# Patient Record
Sex: FEMALE | Race: WHITE | NOT HISPANIC OR LATINO | Employment: STUDENT | URBAN - METROPOLITAN AREA
[De-identification: names, ages, dates, MRNs, and addresses within clinical notes are randomized per-mention and may not be internally consistent; named-entity substitution may affect disease eponyms.]

---
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
## 2024-04-03 NOTE — PROGRESS NOTES
[facility] Consult  PATIENT:  Patricia Flanagan  MRN:  [id number]  :  2005  DATE OF SERVICE:  2024  REFERRED BY: Self, Referral  PCP: Shimon Sheikh MD    Assessment/Plan:     Migraine without aura and without status migrainosus, not intractable  4 year history of migraines being treated with Nurtec prescribed by the PCP. Patient stated that the medication is working well for her but she is still experiencing about 5 migraine headaches per month. She experiences occasional darting of her vision while she is at rest along with vertigo sensation.  This has happened about 10 times since  and only lasts 5 minutes or less.  She was evaluated by optometry but has never seen ophthalmology.   Workup:  With no new or concerning symptoms, no red flags and an unremarkable neurologic exam, there is no specific indication for further evaluation with MRI brain.  However, this could be obtained at any time if indicated  Blood work ordered for patient to complete at her convenience to check for treatable causes of her symptoms: vit D, TSH, iron, CBC, CMP  Preventative:  We discussed headache hygiene and lifestyle factors that may improve headaches  She wasn't keen on starting a preventative medication right away since her migraines are aborted by the nurtec.  She is still experiencing 5 or more migraine days per month, so I did explain the rationale behind preventative medication and that it might help with her ocular symptoms as well  We discussed supplements, Magnesium and B2 at 400mg daily, which patient seemed interested in  We discussed propranolol as well as CGRP medications like nurtec and Ajovy.  Information was provided to patient at today's visit and she would like to take time to consider her options  Past/ failed/contraindicated: amitriptyline, topamax  Future options: propranolol,  CGRP med, botox  Acute:  Discussed not taking over-the-counter or prescription pain medications  more than 3 days per week to prevent medication overuse/rebound headache  Continue nurtec at the earliest onset of a migraine   Future options:  Triptan, ubrelvy, reyvow      Other subjective visual disturbances  After roque, she mentioned to her mom that she was having some visual disturbances where her vision felt like it was shifting from left to right.  Denies blurry vision. Feel like the room is spinning when this happens but she has always been sitting down.  This has occurred about 10x since December and the episodes last less than 5 minutes.  She sometimes has these subjective visual disturbances without the head pain.  Mom has not been able to witness them so we are unsure if the eye darting is in fact nystagmus.  She is on the computer a lot and has a lot of screen time in general, so could be related to eye strain fatigue.  She saw the optometrist and they told her that her vision was good and they recommended eye drops for dry eye.  She is unsure if this has been helping with the symptoms.     Neuro exam in office was reassuring.  No nystagmus noted on exam  Referral to ophthalmology was ordered for patient for thorough dilated eye exam which she has never had completed  Symptoms are suspicious for ocular fatigue or possible ocular migraine  Continue eye drops for dry eyes  Avoid too much screen time which can lead to eye strain and fatigue       Diagnoses and all orders for this visit:    Migraine without aura and without status migrainosus, not intractable  -     Ambulatory Referral to Ophthalmology; Future  -     CBC and differential; Future  -     Comprehensive metabolic panel; Future  -     Iron Panel (Includes Ferritin, Iron Sat%, Iron, and TIBC); Future  -     Vitamin D 25 hydroxy; Future  -     TSH + Free T4; Future    Other subjective visual disturbances  -     Cancel: EEG Routine and awake; Future    Other orders  -     ibuprofen (MOTRIN) 600 mg tablet; Take 600 mg by mouth 4 (four) times a  day         Patient should follow up in 3 months, or I would be happy to see her sooner if needed.  Patient should call the office or send a MyChart message with any questions or concerns.  Patient should present to the nearest emergency department with any concerning symptoms.     CC:     We had the pleasure of evaluating Patricia in neurological consultation today. she is a 18 y.o. year-old female who presents today for evaluation of headaches.     History obtained from patient as well as available medical record review.    History of Present Illness:     This patient has a HX of Migraines and they are treated with Nurtec prescribed by the PCP. Patient stated that the medication is working well for her but she is still experiencing about 5 migraine headaches per month.     After roque, she mentioned to her mom that she was having some visual disturbances where her vision felt like it was shifting from left to right.  Denies blurry vision. Feel like the room is spinning when this happens but she has always been sitting down.  This has occurred about 10x since December and the episodes last less than 5 minutes.  She sometimes has these subjective visual disturbances without the head pain.  Mom has not been able to witness them so we are unsure if the eye darting is in fact nystagmus.  She is on the computer a lot and has a lot of screen time in general, so could be related to eye strain fatigue.  She saw the optometrist and they told her that her vision was good and they recommended eye drops for dry eye.  She is unsure if this has been helping with the symptoms.       Headaches started at what age? 14 years old.    How often do the headaches occur? 5 per mo nth  What time of the day do the headaches start?  Can have them upon waking or they progress throughout the day.   How long do the headaches last? Headaches could last all day. If she takes the nurtec it's usually gone within two hours.   Are you ever headache  free? No  Aura? without aura  Where is your headache located and pain quality? Stabbing pain on the top of her head can be left or right sided.   What is the intensity of pain? Worst 9/10, Average: 5/10  Associated symptoms:   [x] Nausea       [] Vomiting        [] Diarrhea  [] Stiff or sore neck   [] Problems with concentration  [x] Photophobia     [x]Phonophobia      [] Osmophobia  [] Blurred vision   [x] Prefer quiet, dark room  [x] Light-headed or dizzy     [] Tinnitus   [] Hands or feet tingle or feel numb/paresthesias    [] Ptosis      [] Facial droop  [] Lacrimation  [] Nasal congestion/rhinorrhea        Things that make the headache worse? Standing up makes the pain worse.     Headache triggers:  none in particular. Sometimes around menstrual cycle.     Have you seen someone else for headaches or pain? Yes, PCP treating  Have you had trigger point injection performed and how often? No  Have you had Botox injection performed and how often? No   Have you had epidural injections or transforaminal injections performed? No  Are you current pregnant or planning on getting pregnant? No. On birth control  Have you ever had any Brain imaging? yes brain MRI    Last eye exam: optometry     What medications do you take or have you taken for your headaches?:    ABORTIVE:    OTC medications: none  Prescription: nurtec  Past/failed/contraindicated:     PREVENTIVE:   OTC medications: none  Prescription: none  Past/failed/contraindicated: Topamax (patient wasn't happy with side effects), amitriptyline (ineffective)    Lifestyle history/modifications:    Sleep   Averages: 7-8 hours   Problems falling asleep?:   Yes  Problems staying asleep?:  No  Physical activity: nothing routine. Walking on campus  Water: 40oz per day  Caffeine: 0 per day. Occasional soda  Mood: feel like she is doing well. Currently in college.     Pertinent family history:  Family history of headaches:  migraine headaches in mother and grandmother  Any  family history of aneurysms - No    Pertinent social history:  Work: part time on campus  Education: full time college student currently  Lives with a roommate in the dorms  Illicit Drugs: denies  Alcohol/tobacco: Denies alcohol use, Denies tobacco use    Past Medical History:     Past Medical History:   Diagnosis Date    Croup in child     Hives     resolved    Migraines     Seasonal allergies     Wears contact lenses        Past Surgical History:   Procedure Laterality Date    RI CYSTO BLADDER W/URETERAL CATHETERIZATION N/A 10/10/2022    Procedure: CYSTOSCOPY WITH INSERTION STENT URETERAL;  Surgeon: Jorge Hernandez MD;  [facility];  Service: Urology    RI LAPAROSCOPY SURG PYELOPLASTY Left 10/10/2022    Procedure: PYELOPLASTY LAPAROSCOPIC W/ ROBOTICS;  Surgeon: Jorge Hernandez MD;  [facility];  Service: Urology    RI LAPAROSCOPY SURG PYELOPLASTY Right 6/19/2023    Procedure: PYELOPLASTY LAPAROSCOPIC W/ ROBOTICS;  Surgeon: Jorge Hernandez MD;  [facility];  Service: Urology    URETERAL STENT PLACEMENT Right 6/19/2023    Procedure: INSERTION STENT URETERAL;  Surgeon: Jorge Hernandez MD;  [facility];  Service: Urology       Patient Active Problem List   Diagnosis    Ankle pain    Allergic rhinitis due to house dust mite    Chronic seasonal allergic rhinitis due to pollen    Allergic rhinitis due to cats    Chronic idiopathic urticaria    Hydronephrosis    Flank pain    UPJ obstruction, congenital    Injury to kidney    Allergic reaction    Migraine without aura and without status migrainosus, not intractable    Other subjective visual disturbances       Medications:     Current Outpatient Medications   Medication Sig Dispense Refill    fluticasone (FLONASE) 50 mcg/act nasal spray 1 spray into each nostril if needed      ibuprofen (MOTRIN) 600 mg tablet Take 600 mg by mouth 4 (four) times a day      ketoconazole (NIZORAL) 2 % shampoo 2 (two) times a week      ketotifen (ZADITOR) 0.025  % ophthalmic solution 1 drop 2 (two) times a day as needed      Multiple Vitamin (multivitamin) tablet Take 1 tablet by mouth daily      norethindrone-ethinyl estradiol (Junel FE 1/20) 1-20 MG-MCG per tablet TAKE 1 TABLET BY MOUTH EVERY DAY 84 tablet 1    Nurtec 75 MG TBDP Take by mouth daily as needed       No current facility-administered medications for this visit.        Allergies:     Allergies   Allergen Reactions    Skin Protectants, Misc. [Lanolin] Hives     Has local allergic reaction to skin GLUE       Family History:     Family History   Problem Relation Age of Onset    Sjogren's syndrome Mother     Hashimoto's thyroiditis Mother     No Known Problems Father     No Known Problems Sister        Social History:     Social History     Socioeconomic History    Marital status: Single     Spouse name: Not on file    Number of children: 0    Years of education: Not on file    Highest education level: Not on file   Occupational History    Occupation: student    Tobacco Use    Smoking status: Never     Passive exposure: Never    Smokeless tobacco: Never   Vaping Use    Vaping status: Never Used   Substance and Sexual Activity    Alcohol use: Never    Drug use: Never    Sexual activity: Never   Other Topics Concern    Not on file   Social History Narrative    Who lives in your home: Mom, Dad, and sister     What type of home do you live in: Single house    Age of your home: older 100+     How long have you been living there: 2004    Type of heat: Baseboard and Other pellet stove    Type of fuel: Electric and Pellets    What type of leander is in your bedroom: Carpet    Do you have the following in or near your home:    Air products: Window air conditioning, Humidifier and Dehumidifier    Pests: None    Pets: Dog and Other hermit crab X 2     Are pets allowed in bedroom: Yes    Open fields, wooded areas nearby: N/A    Basement: Wet, Damp, Musty and Unfinished    Exposure to second hand smoke: No        Habits:     "Caffeine: soda 2 times a week    Chocolate: daily     Other:     Social Determinants of Health     Financial Resource Strain: Not on file   Food Insecurity: Not on file   Transportation Needs: Not on file   Physical Activity: Insufficiently Active (2021)    Exercise Vital Sign     Days of Exercise per Week: 2 days     Minutes of Exercise per Session: 50 min   Stress: Not on file   Social Connections: Not on file   Intimate Partner Violence: Not on file   Housing Stability: Not on file       Objective:     Physical Exam:    Vitals:  /73 (BP Location: Right arm, Patient Position: Sitting, Cuff Size: Standard)   Pulse 94   Temp 98.4 °F (36.9 °C) (Temporal)   Ht 5' 7\" (1.702 m)   Wt 53.1 kg (117 lb)   BMI 18.32 kg/m²   BP Readings from Last 3 Encounters:   24 111/73   23 116/78   23 100/70     Pulse Readings from Last 3 Encounters:   24 94   23 98   23 (!) 132       Constitutional:       Appearance: Normal appearance.   HENT:      Head: Normocephalic and atraumatic.      Nose: Nose normal.      Mouth: Mucous membranes are moist.   Eyes:      Extraocular Movements: Extraocular movements intact.      Conjunctiva/sclera: Conjunctivae normal.      Pupils: Pupils are equal, round, and reactive to light.    Pulmonary:      Effort: Pulmonary effort is normal.    Skin:     General: Skin is warm and dry.   Psychiatric:        Affect normal.   Neurologic Exam     Mental Status   Oriented to person, place, and time.   Speech: speech is normal   Level of consciousness: alert  Able to name object.     Cranial Nerves   Cranial nerves II through XII intact.     Motor Exam   Muscle bulk: normal    Strength   Strength 5/5 throughout.     Sensory Exam   Light touch normal.     Gait, Coordination, and Reflexes     Gait  Gait: normal    Reflexes   Right patellar: 2+  Left patellar: 2+      Pertinent lab results: no recent lab work to review.      Pertinent Imagin21 MRI Brain wo " contrast: Normal brain MRI.      Review of Systems:     Review of Systems   Constitutional:  Negative for appetite change and fever.   HENT: Negative.  Negative for hearing loss, tinnitus, trouble swallowing and voice change.    Eyes:  Positive for photophobia and visual disturbance (vision moves from left to right). Negative for pain.   Respiratory: Negative.  Negative for shortness of breath.    Cardiovascular: Negative.  Negative for palpitations.   Gastrointestinal:  Positive for nausea. Negative for vomiting.   Endocrine: Negative.  Negative for cold intolerance.   Genitourinary: Negative.  Negative for dysuria, frequency and urgency.   Musculoskeletal: Negative.  Negative for myalgias and neck pain.   Skin: Negative.  Negative for rash.   Neurological:  Positive for headaches. Negative for dizziness, tremors, seizures, syncope, facial asymmetry, speech difficulty, weakness, light-headedness and numbness.   Hematological: Negative.  Does not bruise/bleed easily.   Psychiatric/Behavioral:  Positive for sleep disturbance. Negative for confusion and hallucinations.    All other systems reviewed and are negative.       Reviewed ROS as entered by medical assistant.     I have spent a total time of 45 minutes on 04/04/24 in caring for this patient including Diagnostic results, Prognosis, Risks and benefits of tx options, Instructions for management, Patient and family education, Importance of tx compliance, Risk factor reductions, Impressions, Counseling / Coordination of care, Documenting in the medical record, Reviewing / ordering tests, medicine, procedures  , and Obtaining or reviewing history  .       Author:  ALEJA Maxwell 4/4/2024 10:12 AM

## 2024-04-03 NOTE — PATIENT INSTRUCTIONS
Patient Instructions:    Additional Testing  -You are not in need of cerebrovascular imaging at this time.   -I am recommending further workup with lab testing.  Complete at your convenience.   -Referral placed for ophthalmology-please schedule at your earliest convenience.     Headache/migraine treatment:     Prevention  -To take every day to help prevent headaches - not to take at the time of headache:  -Discussed propranolol and CGRP inhibitors-information provided.  Please reach out on [employer] after you have had time to review.   -Over the counter preventive supplements for headaches/migraines (if you try, try for 3 months straight):  -Magnesium 400mg daily (If any diarrhea or upset stomach, decrease dose as tolerated)  -Riboflavin (Vitamin B2) 400mg daily (may make your urine bright/neon yellow)  - All supplements can be purchased online    Abortive  -For immediate treatment of a headache/migraine  -For your more moderate to severe migraines take this medication as early as possible:  -Continue nurtec  -It is ok to take ibuprofen, acetaminophen or naproxen (Advil, Tylenol,  Aleve, Excedrin) if they help your headaches you should limit these to No more than 3 times a week to avoid medication overuse/rebound headaches.     Lifestyle Recommendations:    -Remain well-hydrated drinking at least 48 to 64 ounces of noncaffeinated beverages per day in addition to anything caffeinated.    -Getting adequate rest is also very important for migraine prevention (aim for 7-8 hours per night).     -Regular exercise is also beneficial for headache prevention.  I would encourage at the least 5 days of physical exercise weekly for at least 30 minutes.   -I would like for them to keep track of their migraines using an application on their phone or calendar as they see fit. Phone applications: Migraine Enrique or Migraine Diary.    Education and Follow-up:    -Please call or send a [employer] message with any questions or concerns.  Please present to the emergency room with any concerning symptoms such as: worst headache of your life, sudden painless loss of vision or double vision, difficulty speaking or swallowing, vertigo/room spinning that does not quickly resolve, or weakness/numbness/loss of coordination affecting 1 side of the face or body.  -Follow up in 3 months or sooner if needed.

## 2024-04-04 ENCOUNTER — OFFICE VISIT (OUTPATIENT)
Dept: NEUROLOGY | Facility: CLINIC | Age: 19
End: 2024-04-04

## 2024-04-04 VITALS
WEIGHT: 117 LBS | TEMPERATURE: 98.4 F | HEIGHT: 67 IN | DIASTOLIC BLOOD PRESSURE: 73 MMHG | SYSTOLIC BLOOD PRESSURE: 111 MMHG | HEART RATE: 94 BPM | BODY MASS INDEX: 18.36 KG/M2

## 2024-04-04 DIAGNOSIS — H53.19 OTHER SUBJECTIVE VISUAL DISTURBANCES: ICD-10-CM

## 2024-04-04 DIAGNOSIS — G43.009 MIGRAINE WITHOUT AURA AND WITHOUT STATUS MIGRAINOSUS, NOT INTRACTABLE: Primary | ICD-10-CM

## 2024-04-04 RX ORDER — IBUPROFEN 600 MG/1
600 TABLET ORAL 4 TIMES DAILY
COMMUNITY
Start: 2023-12-28

## 2024-04-04 NOTE — ASSESSMENT & PLAN NOTE
After roque, she mentioned to her mom that she was having some visual disturbances where her vision felt like it was shifting from left to right.  Denies blurry vision. Feel like the room is spinning when this happens but she has always been sitting down.  This has occurred about 10x since December and the episodes last less than 5 minutes.  She sometimes has these subjective visual disturbances without the head pain.  Mom has not been able to witness them so we are unsure if the eye darting is in fact nystagmus.  She is on the computer a lot and has a lot of screen time in general, so could be related to eye strain fatigue.  She saw the optometrist and they told her that her vision was good and they recommended eye drops for dry eye.  She is unsure if this has been helping with the symptoms.     Neuro exam in office was reassuring.  No nystagmus noted on exam  Referral to ophthalmology was ordered for patient for thorough dilated eye exam which she has never had completed  Symptoms are suspicious for ocular fatigue or possible ocular migraine  Continue eye drops for dry eyes  Avoid too much screen time which can lead to eye strain and fatigue

## 2024-04-04 NOTE — ASSESSMENT & PLAN NOTE
4 year history of migraines being treated with Nurtec prescribed by the PCP. Patient stated that the medication is working well for her but she is still experiencing about 5 migraine headaches per month. She experiences occasional darting of her vision while she is at rest along with vertigo sensation.  This has happened about 10 times since roque and only lasts 5 minutes or less.  She was evaluated by optometry but has never seen ophthalmology.   Workup:  With no new or concerning symptoms, no red flags and an unremarkable neurologic exam, there is no specific indication for further evaluation with MRI brain.  However, this could be obtained at any time if indicated  Blood work ordered for patient to complete at her convenience to check for treatable causes of her symptoms: vit D, TSH, iron, CBC, CMP  Preventative:  We discussed headache hygiene and lifestyle factors that may improve headaches  She wasn't keen on starting a preventative medication right away since her migraines are aborted by the nurtec.  She is still experiencing 5 or more migraine days per month, so I did explain the rationale behind preventative medication and that it might help with her ocular symptoms as well  We discussed supplements, Magnesium and B2 at 400mg daily, which patient seemed interested in  We discussed propranolol as well as CGRP medications like nurtec and Ajovy.  Information was provided to patient at today's visit and she would like to take time to consider her options  Past/ failed/contraindicated: amitriptyline, topamax  Future options: propranolol,  CGRP med, botox  Acute:  Discussed not taking over-the-counter or prescription pain medications more than 3 days per week to prevent medication overuse/rebound headache  Continue nurtec at the earliest onset of a migraine   Future options:  Triptan, ubrelvy, reyvow

## 2024-04-04 NOTE — PROGRESS NOTES
Review of Systems   Constitutional:  Negative for appetite change and fever.   HENT: Negative.  Negative for hearing loss, tinnitus, trouble swallowing and voice change.    Eyes:  Positive for photophobia and visual disturbance (vision moves from left to right). Negative for pain.   Respiratory: Negative.  Negative for shortness of breath.    Cardiovascular: Negative.  Negative for palpitations.   Gastrointestinal:  Positive for nausea. Negative for vomiting.   Endocrine: Negative.  Negative for cold intolerance.   Genitourinary: Negative.  Negative for dysuria, frequency and urgency.   Musculoskeletal: Negative.  Negative for myalgias and neck pain.   Skin: Negative.  Negative for rash.   Neurological:  Positive for headaches. Negative for dizziness, tremors, seizures, syncope, facial asymmetry, speech difficulty, weakness, light-headedness and numbness.   Hematological: Negative.  Does not bruise/bleed easily.   Psychiatric/Behavioral:  Positive for sleep disturbance. Negative for confusion and hallucinations.    All other systems reviewed and are negative.

## 2024-04-07 DIAGNOSIS — N92.1 MENOMETRORRHAGIA: ICD-10-CM

## 2024-04-10 DIAGNOSIS — N92.1 MENOMETRORRHAGIA: ICD-10-CM

## 2024-04-10 RX ORDER — NORETHINDRONE ACETATE AND ETHINYL ESTRADIOL 1MG-20(21)
1 KIT ORAL DAILY
Qty: 28 TABLET | Refills: 0 | Status: SHIPPED | OUTPATIENT
Start: 2024-04-10 | End: 2024-04-10 | Stop reason: SDUPTHER

## 2024-04-10 RX ORDER — NORETHINDRONE ACETATE AND ETHINYL ESTRADIOL AND FERROUS FUMARATE 1MG-20(21)
1 KIT ORAL DAILY
Qty: 84 TABLET | Refills: 1 | OUTPATIENT
Start: 2024-04-10

## 2024-04-10 RX ORDER — NORETHINDRONE ACETATE AND ETHINYL ESTRADIOL 1MG-20(21)
1 KIT ORAL DAILY
Qty: 84 TABLET | Refills: 0 | Status: SHIPPED | OUTPATIENT
Start: 2024-04-10

## 2024-04-10 NOTE — TELEPHONE ENCOUNTER
Reason for call:   [x] Refill   [] Prior Auth  [] Other:     Office:   [] PCP/Provider -   [x] Specialty/Provider - OB    Medication: norethindrone-ethinyl estradiol (Junel FE 1/20) 1-20 MG-MCG per tablet     Dose/Frequency:   Summary: TAKE 1 TABLET BY MOUTH EVERY DAY       Quantity: 84    Pharmacy: [id number] - [place], [place] - [address] [phone]     Does the patient have enough for 3 days?   [x] Yes   [] No - Send as HP to POD

## 2024-04-26 ENCOUNTER — NEW PATIENT (OUTPATIENT)
Dept: URBAN - METROPOLITAN AREA CLINIC 6 | Facility: CLINIC | Age: 19
End: 2024-04-26

## 2024-04-26 DIAGNOSIS — G43.109: ICD-10-CM

## 2024-04-26 DIAGNOSIS — H53.19: ICD-10-CM

## 2024-04-26 DIAGNOSIS — H50.51: ICD-10-CM

## 2024-04-26 PROCEDURE — 92004 COMPRE OPH EXAM NEW PT 1/>: CPT

## 2024-04-26 PROCEDURE — 92060 SENSORIMOTOR EXAMINATION: CPT | Mod: NC

## 2024-04-26 ASSESSMENT — VISUAL ACUITY
OD_CC: 20/20
OS_CC: 20/25-2

## 2024-04-26 ASSESSMENT — TONOMETRY
OD_IOP_MMHG: 24
OS_IOP_MMHG: 24

## 2024-05-11 NOTE — PROGRESS NOTES
Assessment/Plan   Problem List Items Addressed This Visit    None  Visit Diagnoses       Well woman exam    -  Primary    Menometrorrhagia        Relevant Medications    norethindrone-ethinyl estradiol (Junel FE 1/20) 1-20 MG-MCG per tablet            Discussion  I have discussed the importance of monthly self-breast exams, exercise and healthy diet.    Encourage safe sexual practices; STI testing - declines  Contraception - CRISPIN. Discussed side effects, discontinuing pill (if desires), long term effects, and effects on menstrual migrianes.    Reviewed Gardasil vaccine and prevention of HPV. Education provided with patient and mother. She will consider and set an appt if desiring.    The current ASCCP guidelines were reviewed. Patient does not require pap due to age.    All questions have been answered to her satisfaction  RTO for APE or sooner if needed    Subjective     HPI   Patricia Flanagan is a 19 y.o. female who presents for annual well woman exam.     Menarche - 15; LMP - 05/07/24; Periods are reg q 28 days and last 4 days; Bleed is light; cramping are the worst on the first day but is tolerable for the rest of the cycle.  She takes advil; helps. +menstrual migraines; has an appt with neurologist.    No vulvar itch/burn; No vaginal itch/burn; No abn discharge or odor;    No urinary sx - burning/pain/frequency/hematuria    (+) SBEs - no breast masses, asymmetry, nipple discharge or bleeding, changes in skin of breast, or breast tenderness bilaterally.    No abd/pelvic pain or HAs;     Pt is virginal.    Gardasil - never had.   (+) PCP for routine Bw/care;          Review of Systems   Constitutional:  Negative for fatigue.   Eyes:  Negative for photophobia and visual disturbance.   Respiratory:  Negative for cough and shortness of breath.    Cardiovascular:  Negative for chest pain and palpitations.   Gastrointestinal:  Negative for abdominal pain, blood in stool, constipation, diarrhea, nausea and rectal  pain.   Genitourinary:  Negative for dyspareunia, dysuria, flank pain, frequency, genital sores, menstrual problem, pelvic pain, urgency, vaginal bleeding, vaginal discharge and vaginal pain.   Musculoskeletal:  Negative for arthralgias and back pain.   Skin:  Negative for rash.   Neurological:  Positive for headaches. Negative for weakness.       The following portions of the patient's history were reviewed and updated as appropriate: allergies, current medications, past family history, past medical history, past social history, past surgical history, and problem list.         OB History          0    Para   0    Term   0       0    AB   0    Living   0         SAB   0    IAB   0    Ectopic   0    Multiple   0    Live Births   0                 Past Medical History:   Diagnosis Date    Croup in child     Hives     resolved    Migraines     Seasonal allergies     Wears contact lenses        Past Surgical History:   Procedure Laterality Date    NJ CYSTO BLADDER W/URETERAL CATHETERIZATION N/A 10/10/2022    Procedure: CYSTOSCOPY WITH INSERTION STENT URETERAL;  Surgeon: Jorge Hernandez MD;  [facility]: [facility];  Service: Urology    NJ LAPAROSCOPY SURG PYELOPLASTY Left 10/10/2022    Procedure: PYELOPLASTY LAPAROSCOPIC W/ ROBOTICS;  Surgeon: Jorge Hernandez MD;  [facility];  Service: Urology    NJ LAPAROSCOPY SURG PYELOPLASTY Right 2023    Procedure: PYELOPLASTY LAPAROSCOPIC W/ ROBOTICS;  Surgeon: Jorge Hernandez MD;  [facility];  Service: Urology    URETERAL STENT PLACEMENT Right 2023    Procedure: INSERTION STENT URETERAL;  Surgeon: Jorge Hernandez MD;  [facility];  Service: Urology       Family History   Problem Relation Age of Onset    Sjogren's syndrome Mother     Hashimoto's thyroiditis Mother     Migraines Mother     Thyroid disease Mother     No Known Problems Father     No Known Problems Sister     Heart disease Maternal Grandfather     Diabetes Paternal  Grandfather     Heart attack Paternal Grandfather     Heart disease Paternal Grandfather     Hypertension Paternal Grandfather     Cancer Paternal Grandmother        Social History     Socioeconomic History    Marital status: Single     Spouse name: Not on file    Number of children: 0    Years of education: Not on file    Highest education level: Not on file   Occupational History    Occupation: student    Tobacco Use    Smoking status: Never     Passive exposure: Never    Smokeless tobacco: Never   Vaping Use    Vaping status: Never Used   Substance and Sexual Activity    Alcohol use: Never    Drug use: Never    Sexual activity: Never   Other Topics Concern    Not on file   Social History Narrative    Who lives in your home: Mom, Dad, and sister     What type of home do you live in: Single house    Age of your home: older 100+     How long have you been living there: 2004    Type of heat: Baseboard and Other pellet stove    Type of fuel: Electric and Pellets    What type of leander is in your bedroom: Carpet    Do you have the following in or near your home:    Air products: Window air conditioning, Humidifier and Dehumidifier    Pests: None    Pets: Dog and Other hermit crab X 2     Are pets allowed in bedroom: Yes    Open fields, wooded areas nearby: N/A    Basement: Wet, Damp, Musty and Unfinished    Exposure to second hand smoke: No        Habits:    Caffeine: soda 2 times a week    Chocolate: daily     Other:     Social Determinants of Health     Financial Resource Strain: Not on file   Food Insecurity: Not on file   Transportation Needs: Not on file   Physical Activity: Insufficiently Active (12/16/2021)    Exercise Vital Sign     Days of Exercise per Week: 2 days     Minutes of Exercise per Session: 50 min   Stress: Not on file   Social Connections: Not on file   Intimate Partner Violence: Not on file   Housing Stability: Not on file         Current Outpatient Medications:     fluticasone (FLONASE) 50  "mcg/act nasal spray, 1 spray into each nostril if needed, Disp: , Rfl:     ketoconazole (NIZORAL) 2 % shampoo, 2 (two) times a week, Disp: , Rfl:     ketotifen (ZADITOR) 0.025 % ophthalmic solution, 1 drop 2 (two) times a day as needed, Disp: , Rfl:     Multiple Vitamin (multivitamin) tablet, Take 1 tablet by mouth daily, Disp: , Rfl:     norethindrone-ethinyl estradiol (Junel FE 1/20) 1-20 MG-MCG per tablet, Take 1 tablet by mouth daily, Disp: 84 tablet, Rfl: 3    Nurtec 75 MG TBDP, Take by mouth daily as needed, Disp: , Rfl:     Allergies   Allergen Reactions    Skin Protectants, Misc. [Lanolin] Hives     Has local allergic reaction to skin GLUE       Objective   Vitals:    05/13/24 1313   BP: 126/74   BP Location: Left arm   Patient Position: Sitting   Cuff Size: Standard   Weight: 50.8 kg (112 lb)   Height: 5' 7\" (1.702 m)     Physical Exam  Vitals and nursing note reviewed.   Constitutional:       Appearance: Normal appearance. She is well-developed and normal weight.   HENT:      Head: Normocephalic and atraumatic.   Eyes:      Conjunctiva/sclera: Conjunctivae normal.   Cardiovascular:      Rate and Rhythm: Normal rate and regular rhythm.      Heart sounds: Normal heart sounds.   Pulmonary:      Effort: Pulmonary effort is normal.      Breath sounds: Normal breath sounds.   Abdominal:      General: Abdomen is flat. There is no distension.      Palpations: Abdomen is soft. There is no mass.      Tenderness: There is no abdominal tenderness. There is no right CVA tenderness or left CVA tenderness.   Musculoskeletal:         General: No tenderness. Normal range of motion.      Cervical back: Normal range of motion. No tenderness.      Right lower leg: No edema.      Left lower leg: No edema.   Lymphadenopathy:      Cervical: No cervical adenopathy.   Skin:     General: Skin is warm and dry.   Neurological:      Mental Status: She is alert and oriented to person, place, and time.      Motor: No weakness. "   Psychiatric:         Mood and Affect: Mood normal.         Behavior: Behavior normal.         Thought Content: Thought content normal.         Judgment: Judgment normal.         There are no Patient Instructions on file for this visit.

## 2024-05-13 ENCOUNTER — APPOINTMENT (OUTPATIENT)
Dept: LAB | Facility: MEDICAL CENTER | Age: 19
End: 2024-05-13
Payer: COMMERCIAL

## 2024-05-13 ENCOUNTER — ANNUAL EXAM (OUTPATIENT)
Dept: OBGYN CLINIC | Facility: CLINIC | Age: 19
End: 2024-05-13
Payer: COMMERCIAL

## 2024-05-13 VITALS
BODY MASS INDEX: 17.58 KG/M2 | HEIGHT: 67 IN | SYSTOLIC BLOOD PRESSURE: 126 MMHG | WEIGHT: 112 LBS | DIASTOLIC BLOOD PRESSURE: 74 MMHG

## 2024-05-13 DIAGNOSIS — Z01.419 WELL WOMAN EXAM: Primary | ICD-10-CM

## 2024-05-13 DIAGNOSIS — G43.009 MIGRAINE WITHOUT AURA AND WITHOUT STATUS MIGRAINOSUS, NOT INTRACTABLE: ICD-10-CM

## 2024-05-13 DIAGNOSIS — N92.1 MENOMETRORRHAGIA: ICD-10-CM

## 2024-05-13 LAB
25(OH)D3 SERPL-MCNC: 22.5 NG/ML (ref 30–100)
ALBUMIN SERPL BCP-MCNC: 4.5 G/DL (ref 3.5–5)
ALP SERPL-CCNC: 73 U/L (ref 34–104)
ALT SERPL W P-5'-P-CCNC: 12 U/L (ref 7–52)
ANION GAP SERPL CALCULATED.3IONS-SCNC: 10 MMOL/L (ref 4–13)
AST SERPL W P-5'-P-CCNC: 15 U/L (ref 13–39)
BASOPHILS # BLD AUTO: 0.07 THOUSANDS/ÂΜL (ref 0–0.1)
BASOPHILS NFR BLD AUTO: 2 % (ref 0–1)
BILIRUB SERPL-MCNC: 0.31 MG/DL (ref 0.2–1)
BUN SERPL-MCNC: 14 MG/DL (ref 5–25)
CALCIUM SERPL-MCNC: 9 MG/DL (ref 8.4–10.2)
CHLORIDE SERPL-SCNC: 103 MMOL/L (ref 96–108)
CO2 SERPL-SCNC: 26 MMOL/L (ref 21–32)
CREAT SERPL-MCNC: 0.7 MG/DL (ref 0.6–1.3)
EOSINOPHIL # BLD AUTO: 0.1 THOUSAND/ÂΜL (ref 0–0.61)
EOSINOPHIL NFR BLD AUTO: 3 % (ref 0–6)
ERYTHROCYTE [DISTWIDTH] IN BLOOD BY AUTOMATED COUNT: 12.5 % (ref 11.6–15.1)
FERRITIN SERPL-MCNC: 23 NG/ML (ref 11–307)
GFR SERPL CREATININE-BSD FRML MDRD: 125 ML/MIN/1.73SQ M
GLUCOSE P FAST SERPL-MCNC: 81 MG/DL (ref 65–99)
HCT VFR BLD AUTO: 41.4 % (ref 34.8–46.1)
HGB BLD-MCNC: 13.4 G/DL (ref 11.5–15.4)
IMM GRANULOCYTES # BLD AUTO: 0.01 THOUSAND/UL (ref 0–0.2)
IMM GRANULOCYTES NFR BLD AUTO: 0 % (ref 0–2)
IRON SATN MFR SERPL: 13 % (ref 15–50)
IRON SERPL-MCNC: 54 UG/DL (ref 50–212)
LYMPHOCYTES # BLD AUTO: 1.76 THOUSANDS/ÂΜL (ref 0.6–4.47)
LYMPHOCYTES NFR BLD AUTO: 46 % (ref 14–44)
MCH RBC QN AUTO: 29.3 PG (ref 26.8–34.3)
MCHC RBC AUTO-ENTMCNC: 32.4 G/DL (ref 31.4–37.4)
MCV RBC AUTO: 90 FL (ref 82–98)
MONOCYTES # BLD AUTO: 0.33 THOUSAND/ÂΜL (ref 0.17–1.22)
MONOCYTES NFR BLD AUTO: 9 % (ref 4–12)
NEUTROPHILS # BLD AUTO: 1.52 THOUSANDS/ÂΜL (ref 1.85–7.62)
NEUTS SEG NFR BLD AUTO: 40 % (ref 43–75)
NRBC BLD AUTO-RTO: 0 /100 WBCS
PLATELET # BLD AUTO: 356 THOUSANDS/UL (ref 149–390)
PMV BLD AUTO: 9.8 FL (ref 8.9–12.7)
POTASSIUM SERPL-SCNC: 4 MMOL/L (ref 3.5–5.3)
PROT SERPL-MCNC: 7.5 G/DL (ref 6.4–8.4)
RBC # BLD AUTO: 4.58 MILLION/UL (ref 3.81–5.12)
SODIUM SERPL-SCNC: 139 MMOL/L (ref 135–147)
T4 FREE SERPL-MCNC: 0.84 NG/DL (ref 0.61–1.12)
TIBC SERPL-MCNC: 426 UG/DL (ref 250–450)
TSH SERPL DL<=0.05 MIU/L-ACNC: 1.23 UIU/ML (ref 0.45–4.5)
UIBC SERPL-MCNC: 372 UG/DL (ref 155–355)
WBC # BLD AUTO: 3.79 THOUSAND/UL (ref 4.31–10.16)

## 2024-05-13 PROCEDURE — 80053 COMPREHEN METABOLIC PANEL: CPT

## 2024-05-13 PROCEDURE — 36415 COLL VENOUS BLD VENIPUNCTURE: CPT

## 2024-05-13 PROCEDURE — 83550 IRON BINDING TEST: CPT

## 2024-05-13 PROCEDURE — S0612 ANNUAL GYNECOLOGICAL EXAMINA: HCPCS

## 2024-05-13 PROCEDURE — 82728 ASSAY OF FERRITIN: CPT

## 2024-05-13 PROCEDURE — 84439 ASSAY OF FREE THYROXINE: CPT

## 2024-05-13 PROCEDURE — 82306 VITAMIN D 25 HYDROXY: CPT

## 2024-05-13 PROCEDURE — 85025 COMPLETE CBC W/AUTO DIFF WBC: CPT

## 2024-05-13 PROCEDURE — 83540 ASSAY OF IRON: CPT

## 2024-05-13 PROCEDURE — 84443 ASSAY THYROID STIM HORMONE: CPT

## 2024-05-13 RX ORDER — NORETHINDRONE ACETATE AND ETHINYL ESTRADIOL 1MG-20(21)
1 KIT ORAL DAILY
Qty: 84 TABLET | Refills: 3 | Status: SHIPPED | OUTPATIENT
Start: 2024-05-13

## 2024-05-17 DIAGNOSIS — E55.9 VITAMIN D DEFICIENCY: ICD-10-CM

## 2024-05-17 DIAGNOSIS — E61.1 IRON DEFICIENCY: Primary | ICD-10-CM

## 2024-05-17 RX ORDER — ERGOCALCIFEROL 1.25 MG/1
50000 CAPSULE ORAL WEEKLY
Qty: 6 CAPSULE | Refills: 0 | Status: SHIPPED | OUTPATIENT
Start: 2024-05-17

## 2024-05-17 RX ORDER — FERROUS SULFATE 324(65)MG
324 TABLET, DELAYED RELEASE (ENTERIC COATED) ORAL
Qty: 30 TABLET | Refills: 3 | Status: SHIPPED | OUTPATIENT
Start: 2024-05-17

## 2024-06-14 DIAGNOSIS — H53.19 OTHER SUBJECTIVE VISUAL DISTURBANCES: Primary | ICD-10-CM

## 2024-06-14 DIAGNOSIS — G43.009 MIGRAINE WITHOUT AURA AND WITHOUT STATUS MIGRAINOSUS, NOT INTRACTABLE: ICD-10-CM

## 2024-06-14 NOTE — TELEPHONE ENCOUNTER
Per ophthalmology note dated 4/26/24, recommending repeat MRI since last one was from 5 years ago. Would like to evaluate for any structural abnormalities since patient reporting visual disturbances including intermittent nystagmus which was not see in the office during her ophthalmology visit.  I informed patient that I would place the MRI brain order and provided her with the phone number for central scheduling to set this up.

## 2024-06-24 ENCOUNTER — TELEPHONE (OUTPATIENT)
Dept: NEUROLOGY | Facility: CLINIC | Age: 19
End: 2024-06-24

## 2024-06-24 NOTE — TELEPHONE ENCOUNTER
"Grace  6/24/24 3:39PM:    \"This is Izabela Flyfemi, I'm calling for an authorization for Patricia Nickersonfemi and it's for her MRI of the brain with and without contrast. Her birthday is [birth year]. I just needed to speak to you about that. So if you could give me a call back. Thanks.\"    # [phone]  -----------------------------------------------  MRI Brain Referral  in chart 6/14/24   "

## 2024-07-08 ENCOUNTER — OFFICE VISIT (OUTPATIENT)
Dept: NEUROLOGY | Facility: CLINIC | Age: 19
End: 2024-07-08
Payer: COMMERCIAL

## 2024-07-08 VITALS
DIASTOLIC BLOOD PRESSURE: 78 MMHG | HEART RATE: 76 BPM | WEIGHT: 108.9 LBS | BODY MASS INDEX: 17.09 KG/M2 | TEMPERATURE: 97.8 F | SYSTOLIC BLOOD PRESSURE: 100 MMHG | HEIGHT: 67 IN

## 2024-07-08 DIAGNOSIS — G43.009 MIGRAINE WITHOUT AURA AND WITHOUT STATUS MIGRAINOSUS, NOT INTRACTABLE: ICD-10-CM

## 2024-07-08 DIAGNOSIS — E55.9 VITAMIN D DEFICIENCY: ICD-10-CM

## 2024-07-08 DIAGNOSIS — E61.1 IRON DEFICIENCY: Primary | ICD-10-CM

## 2024-07-08 PROCEDURE — 99214 OFFICE O/P EST MOD 30 MIN: CPT

## 2024-07-08 RX ORDER — RIZATRIPTAN BENZOATE 10 MG/1
TABLET ORAL
Qty: 12 TABLET | Refills: 2 | Status: SHIPPED | OUTPATIENT
Start: 2024-07-08

## 2024-07-08 RX ORDER — ATOGEPANT 60 MG/1
60 TABLET ORAL
Qty: 30 TABLET | Refills: 2 | Status: SHIPPED | OUTPATIENT
Start: 2024-07-08 | End: 2024-07-11 | Stop reason: SDUPTHER

## 2024-07-08 RX ORDER — MAGNESIUM 200 MG
200 TABLET ORAL DAILY
COMMUNITY

## 2024-07-08 RX ORDER — ADAPALENE 0.1 G/100G
1 CREAM TOPICAL EVERY OTHER DAY
COMMUNITY
Start: 2024-05-16

## 2024-07-08 NOTE — PATIENT INSTRUCTIONS
Patient Instructions:    Additional Testing  -Complete MRI as scheduled 7/10/24  -I am recommending further workup with lab testing.  Complete at your convenience (repeat iron and vitamin D)    Headache/migraine treatment:     Prevention  -To take every day to help prevent headaches - not to take at the time of headache:  -Start Qulitpa 60mg daily  -May continue over the counter preventive supplements for headaches/migraines (if you try, try for 3 months straight):  -Magnesium 400mg daily (If any diarrhea or upset stomach, decrease dose as tolerated)  -Riboflavin (Vitamin B2) 400mg daily (may make your urine bright/neon yellow)  - All supplements can be purchased online    Abortive  -For immediate treatment of a headache/migraine  -For your more moderate to severe migraines take this medication as early as possible:  -Stop Nurtec. Start Maxalt. One tablet at the onset of a migraine.  May repeat again in 2 hours if not completely headache free.   -It is ok to take ibuprofen, acetaminophen or naproxen (Advil, Tylenol,  Aleve, Excedrin) if they help your headaches you should limit these to No more than 3 times a week to avoid medication overuse/rebound headaches.     Lifestyle Recommendations:    -Remain well-hydrated drinking at least 48 to 64 ounces of noncaffeinated beverages per day in addition to anything caffeinated.    -Getting adequate rest is also very important for migraine prevention (aim for 7-8 hours per night).     -Regular exercise is also beneficial for headache prevention.  I would encourage at the least 5 days of physical exercise weekly for at least 30 minutes.   -I would like for them to keep track of their migraines using an application on their phone or calendar as they see fit. Phone applications: Migraine Enrique or Migraine Diary.    Education and Follow-up:    -Please call or send a [employer] message with any questions or concerns. Please present to the emergency room with any concerning symptoms  such as: worst headache of your life, sudden painless loss of vision or double vision, difficulty speaking or swallowing, vertigo/room spinning that does not quickly resolve, or weakness/numbness/loss of coordination affecting 1 side of the face or body.  -Follow up in 2 months or sooner if needed.

## 2024-07-08 NOTE — ASSESSMENT & PLAN NOTE
4 year history of migraines being treated with Nurtec prescribed by the PCP. Since her last office visit, she is doing about the same.  Reports still experiencing at least 5 migraines a month and at least one headache a week, which is the same frequency as her last office visit in April. She is still having the issues with her vision, although it has been approximately one month since the patient experienced the visual shifting sensation. . Nurtec has been taking longer to work for her migraines, which have been lasting approximately 7 hours. 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   Workup:  MRI brain ordered at last visit-patient has this scheduled for 7/10/24  Blood work reviewed: CMP, CBC, TSH all WNL.  Vitamin D and iron were low. Sent supplementation to her pharmacy and ordered new labs for her to have completed prior to her next office visit  Preventative:  We discussed headache hygiene and lifestyle factors that may improve headaches  She wasn't keen on starting a preventative medication right away since her migraines are aborted by the nurtec.  She is still experiencing 5 or more migraine days per month, so I did explain the rationale behind preventative medication and that it might help with her ocular symptoms as well  We discussed supplements, Magnesium and B2 at 400mg daily, which patient seemed interested in  We discussed propranolol as well as CGRP medications like nurtec and Ajovy.  Information was provided to patient at today's visit and she would like to take time to consider her options  Past/ failed/contraindicated: amitriptyline, topamax  Future options: propranolol,  CGRP med, botox  Acute:  Discussed not taking over-the-counter or prescription pain medications more than 3 days per week to prevent medication overuse/rebound headache  Continue nurtec at the earliest onset of a migraine   Future options:  Triptan, ubrelvy, reyvow

## 2024-07-08 NOTE — PROGRESS NOTES
[facility]  PATIENT:  Patricia Flanagan  MRN:  [id number]  :  2005  DATE OF SERVICE:  7/10/2024  REFERRED BY: No ref. provider found  PCP: Shimon Sheikh MD    Assessment/Plan:     Migraine without aura and without status migrainosus, not intractable  4 year history of migraines being treated with Nurtec prescribed by the PCP. Since her last office visit, she is doing about the same.  Reports still experiencing at least 5 migraines a month and at least one headache a week, which is the same frequency as her last office visit in April. She is still having the issues with her vision, although it has been approximately one month since the patient experienced the visual shifting sensation. . Nurtec has been taking longer to work for her migraines, which have been lasting approximately 7 hours. 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   Workup:  MRI brain ordered at last visit-patient has this scheduled for 7/10/24  Blood work reviewed: CMP, CBC, TSH all WNL.  Vitamin D and iron were low. Sent supplementation to her pharmacy and ordered new labs for her to have completed prior to her next office visit  Preventative:  We discussed headache hygiene and lifestyle factors that may improve headaches  She wasn't keen on starting a preventative medication right away since her migraines are aborted by the nurtec.  She is still experiencing 5 or more migraine days per month, so I did explain the rationale behind preventative medication and that it might help with her ocular symptoms as well  We discussed supplements, Magnesium and B2 at 400mg daily, which patient seemed interested in  We discussed propranolol as well as CGRP medications like nurtec and Ajovy.  Information was provided to patient at today's visit and she would like to take time to consider her options  Past/ failed/contraindicated: amitriptyline, topamax  Future options:  "propranolol,  CGRP med, botox  Acute:  Discussed not taking over-the-counter or prescription pain medications more than 3 days per week to prevent medication overuse/rebound headache  Continue nurtec at the earliest onset of a migraine   Future options:  Triptan, ubrelvy, reyvow         Diagnoses and all orders for this visit:    Iron deficiency  -     Iron Panel (Includes Ferritin, Iron Sat%, Iron, and TIBC); Future    Vitamin D deficiency  -     Vitamin D 25 hydroxy; Future    Migraine without aura and without status migrainosus, not intractable  -     Atogepant (Qulipta) 60 MG TABS; Take 60 mg by mouth daily at bedtime  -     rizatriptan (Maxalt) 10 mg tablet; 1 tab at onset of migraine; may repeat every 2 hours.  Take no more than 3 doses in a day.    Other orders  -     adapalene (DIFFERIN) 0.1 % cream; Apply 1 Application topically every other day  -     Magnesium 200 MG TABS; Take 200 mg by mouth in the morning  -     Riboflavin (B-2 PO); Take 1 tablet by mouth in the morning         Patient should follow up in 2 months, or I would be happy to see her sooner if needed.  Patient should call the office or send a [employer] message with any questions or concerns.  Patient should present to the nearest emergency department with any concerning symptoms.     CC:     We had the pleasure of evaluating Patricia in neurological follow up today. she is a 19 y.o. year-old female who presents today for evaluation of headaches.     History obtained from patient as well as available medical record review.    History of Present Illness:     She was last seen by me on 4/4/24.  For review, \"This patient has a HX of Migraines and they are treated with Nurtec prescribed by the PCP. Patient stated that the medication is working well for her but she is still experiencing about 5 migraine headaches per month.     After roque, she mentioned to her mom that she was having some visual disturbances where her vision felt like it was " "shifting from left to right.  Denies blurry vision. Feel like the room is spinning when this happens but she has always been sitting down.  This has occurred about 10x since December and the episodes last less than 5 minutes.  She sometimes has these subjective visual disturbances without the head pain.  Mom has not been able to witness them so we are unsure if the eye darting is in fact nystagmus.  She is on the computer a lot and has a lot of screen time in general, so could be related to eye strain fatigue.  She saw the optometrist and they told her that her vision was good and they recommended eye drops for dry eye.  She is unsure if this has been helping with the symptoms.\"        Interval history as of 7/8/24:  Since her last office visit, she is doing about the same.  Reports experiencing at least 5 migraines a month and at least one headache a week, which is the same frequency as her last office visit in April. She is still having the issues with her vision. Nurtec has been taking longer to work for her migraines, which have been lasting approximately 7 hours.  It has been approximately one month since the patient experienced the visual shifting sensation.  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     Headaches started at what age? 14 years old.    How often do the headaches occur? 5 per month  What time of the day do the headaches start?  Can have them upon waking or they progress throughout the day.   How long do the headaches last? Headaches could last all day.   Are you ever headache free? No  Aura? without aura  Where is your headache located and pain quality? Stabbing pain on the top of her head can be left or right sided.   What is the intensity of pain? Worst 9/10, Average: 5/10  Associated symptoms:   [x] Nausea       [] Vomiting        [] Diarrhea  [] Stiff or sore neck   [] Problems with concentration  [x] Photophobia     [x]Phonophobia     "  [] Osmophobia  [] Blurred vision   [x] Prefer quiet, dark room  [x] Light-headed or dizzy     [] Tinnitus   [] Hands or feet tingle or feel numb/paresthesias    [] Ptosis      [] Facial droop  [] Lacrimation  [] Nasal congestion/rhinorrhea        Things that make the headache worse? Standing up makes the pain worse.     Headache triggers:  none in particular. Sometimes around menstrual cycle.     Have you seen someone else for headaches or pain? Yes, PCP treating  Have you had trigger point injection performed and how often? No  Have you had Botox injection performed and how often? No   Have you had epidural injections or transforaminal injections performed? No  Are you current pregnant or planning on getting pregnant? No. On birth control  Have you ever had any Brain imaging? yes brain MRI    Last eye exam: optometry     What medications do you take or have you taken for your headaches?:    ABORTIVE:    OTC medications: none  Prescription: nurtec  Past/failed/contraindicated:     PREVENTIVE:   OTC medications: Magnesium and B2  Prescription: none  Past/failed/contraindicated: Topamax (patient wasn't happy with side effects), amitriptyline (ineffective)    Lifestyle history/modifications:    Sleep   Averages: 7-8 hours   Problems falling asleep?:   Yes  Problems staying asleep?:  No  Physical activity: nothing routine. Walking on campus  Water: 40oz per day  Caffeine: 0 per day. Occasional soda  Mood: feel like she is doing well. Currently in college.     Pertinent family history:  Family history of headaches:  migraine headaches in mother and grandmother  Any family history of aneurysms - No    Pertinent social history:  Work: part time on campus  Education: full time college student currently  Lives with a roommate in the dorms  Illicit Drugs: denies  Alcohol/tobacco: Denies alcohol use, Denies tobacco use    Past Medical History:     Past Medical History:   Diagnosis Date    Croup in child     Hives     resolved     Migraines     Seasonal allergies     Wears contact lenses        Past Surgical History:   Procedure Laterality Date    IN CYSTO BLADDER W/URETERAL CATHETERIZATION N/A 10/10/2022    Procedure: CYSTOSCOPY WITH INSERTION STENT URETERAL;  Surgeon: Jorge Hernandez MD;  [facility];  Service: Urology    IN LAPAROSCOPY SURG PYELOPLASTY Left 10/10/2022    Procedure: PYELOPLASTY LAPAROSCOPIC W/ ROBOTICS;  Surgeon: Jorge Hernandez MD;  [facility];  Service: Urology    IN LAPAROSCOPY SURG PYELOPLASTY Right 6/19/2023    Procedure: PYELOPLASTY LAPAROSCOPIC W/ ROBOTICS;  Surgeon: Jorge Hernandez MD;  [facility];  Service: Urology    URETERAL STENT PLACEMENT Right 6/19/2023    Procedure: INSERTION STENT URETERAL;  Surgeon: Jorge Hernandez MD;  [facility];  Service: Urology       Patient Active Problem List   Diagnosis    Ankle pain    Allergic rhinitis due to house dust mite    Chronic seasonal allergic rhinitis due to pollen    Allergic rhinitis due to cats    Chronic idiopathic urticaria    Hydronephrosis    Flank pain    UPJ obstruction, congenital    Injury to kidney    Allergic reaction    Migraine without aura and without status migrainosus, not intractable    Other subjective visual disturbances       Medications:     Current Outpatient Medications   Medication Sig Dispense Refill    adapalene (DIFFERIN) 0.1 % cream Apply 1 Application topically every other day      Atogepant (Qulipta) 60 MG TABS Take 60 mg by mouth daily at bedtime 30 tablet 2    ergocalciferol (VITAMIN D2) 50,000 units Take 1 capsule (50,000 Units total) by mouth once a week For 6 weeks 6 capsule 0    ferrous sulfate 324 (65 Fe) mg Take 1 tablet (324 mg total) by mouth daily before breakfast 30 tablet 3    fluticasone (FLONASE) 50 mcg/act nasal spray 1 spray into each nostril if needed      ketoconazole (NIZORAL) 2 % shampoo 2 (two) times a week      ketotifen (ZADITOR) 0.025 % ophthalmic solution Administer 1 drop to  both eyes 2 (two) times a day as needed      Magnesium 200 MG TABS Take 200 mg by mouth in the morning      Multiple Vitamin (multivitamin) tablet Take 1 tablet by mouth daily      norethindrone-ethinyl estradiol (Junel FE 1/20) 1-20 MG-MCG per tablet Take 1 tablet by mouth daily 84 tablet 3    Riboflavin (B-2 PO) Take 1 tablet by mouth in the morning      rizatriptan (Maxalt) 10 mg tablet 1 tab at onset of migraine; may repeat every 2 hours.  Take no more than 3 doses in a day. 12 tablet 2     No current facility-administered medications for this visit.        Allergies:     Allergies   Allergen Reactions    Skin Protectants, Misc. [Lanolin] Hives     Has local allergic reaction to skin GLUE       Family History:     Family History   Problem Relation Age of Onset    Sjogren's syndrome Mother     Hashimoto's thyroiditis Mother     Migraines Mother     Thyroid disease Mother     No Known Problems Father     No Known Problems Sister     Heart disease Maternal Grandfather     Diabetes Paternal Grandfather     Heart attack Paternal Grandfather     Heart disease Paternal Grandfather     Hypertension Paternal Grandfather     Cancer Paternal Grandmother        Social History:     Social History     Socioeconomic History    Marital status: Single     Spouse name: Not on file    Number of children: 0    Years of education: Not on file    Highest education level: Not on file   Occupational History    Occupation: student    Tobacco Use    Smoking status: Never     Passive exposure: Never    Smokeless tobacco: Never   Vaping Use    Vaping status: Never Used   Substance and Sexual Activity    Alcohol use: Never    Drug use: Never    Sexual activity: Never   Other Topics Concern    Not on file   Social History Narrative    Who lives in your home: Mom, Dad, and sister     What type of home do you live in: Single house    Age of your home: older 100+     How long have you been living there: 2004    Type of heat: Baseboard and Other  "pellet stove    Type of fuel: Electric and Pellets    What type of leander is in your bedroom: Carpet    Do you have the following in or near your home:    Air products: Window air conditioning, Humidifier and Dehumidifier    Pests: None    Pets: Dog and Other hermit crab X 2     Are pets allowed in bedroom: Yes    Open fields, wooded areas nearby: N/A    Basement: Wet, Damp, Musty and Unfinished    Exposure to second hand smoke: No        Habits:    Caffeine: soda 2 times a week    Chocolate: daily     Other:     Social Determinants of Health     Financial Resource Strain: Not on file   Food Insecurity: Not on file   Transportation Needs: Not on file   Physical Activity: Insufficiently Active (12/16/2021)    Exercise Vital Sign     Days of Exercise per Week: 2 days     Minutes of Exercise per Session: 50 min   Stress: Not on file   Social Connections: Not on file   Intimate Partner Violence: Not on file   Housing Stability: Not on file       Objective:     Physical Exam:    Vitals:  /78 (BP Location: Right arm, Patient Position: Sitting, Cuff Size: Standard)   Pulse 76   Temp 97.8 °F (36.6 °C) (Temporal)   Ht 5' 7\" (1.702 m)   Wt 49.4 kg (108 lb 14.4 oz)   BMI 17.06 kg/m²   BP Readings from Last 3 Encounters:   07/08/24 100/78   05/13/24 126/74   04/04/24 111/73     Pulse Readings from Last 3 Encounters:   07/08/24 76   04/04/24 94   11/24/23 98       Constitutional:       Appearance: Normal appearance.   HENT:      Head: Normocephalic and atraumatic.      Nose: Nose normal.      Mouth: Mucous membranes are moist.   Eyes:      Extraocular Movements: Extraocular movements intact.      Conjunctiva/sclera: Conjunctivae normal.      Pupils: Pupils are equal, round, and reactive to light.    Pulmonary:      Effort: Pulmonary effort is normal.    Skin:     General: Skin is warm and dry.   Psychiatric:        Affect normal.   Neurologic Exam     Mental Status   Oriented to person, place, and time.   Speech: " speech is normal   Level of consciousness: alert  Able to name object.     Cranial Nerves   Cranial nerves II through XII intact.     Motor Exam   Muscle bulk: normal    Strength   Strength 5/5 throughout.     Sensory Exam   Light touch normal.     Gait, Coordination, and Reflexes     Gait  Gait: normal    Reflexes   Right patellar: 2+  Left patellar: 2+      Pertinent lab results:   Lab Results   Component Value Date    SODIUM 139 2024    K 4.0 2024     2024    CO2 26 2024    AGAP 10 2024    BUN 14 2024    CREATININE 0.70 2024    GLUC 84 2023    GLUF 81 2024    CALCIUM 9.0 2024    AST 15 2024    ALT 12 2024    ALKPHOS 73 2024    TP 7.5 2024    TBILI 0.31 2024    EGFR 125 2024     Lab Results   Component Value Date    WBC 3.79 (L) 2024    HGB 13.4 2024    HCT 41.4 2024    MCV 90 2024     2024     Lab Results   Component Value Date    YDR7NWZNTIEQ 1.231 2024     Lab Results   Component Value Date    IRON 54 2024    TIBC 426 2024    FERRITIN 23 2024   Iron saturation 13(L)   UIBC 372 (H)    24 Vitamin D  22.5 (L)     Pertinent Imagin/10/24 MRI Brain scheduled   21 MRI Brain wo contrast: Normal brain MRI.      Review of Systems:     Constitutional: Negative.    HENT: Negative.     Eyes: Negative.    Respiratory: Negative.     Cardiovascular: Negative.    Gastrointestinal: Negative.    Endocrine: Negative.    Genitourinary: Negative.    Musculoskeletal: Negative.    Skin: Negative.    Allergic/Immunologic: Negative.    Neurological:  Positive for headaches.   Hematological: Negative.    Psychiatric/Behavioral: Negative.     Reviewed ROS as entered by medical assistant.     I have spent a total time of 30 minutes on 07/10/24 in caring for this patient including Diagnostic results, Prognosis, Risks and benefits of tx options, Instructions for  management, Patient and family education, Importance of tx compliance, Risk factor reductions, Impressions, Counseling / Coordination of care, Documenting in the medical record, Reviewing / ordering tests, medicine, procedures  , and Obtaining or reviewing history  .       Author:  ALEJA Maxwell 7/10/2024 12:40 PM

## 2024-07-10 ENCOUNTER — TELEPHONE (OUTPATIENT)
Age: 19
End: 2024-07-10

## 2024-07-10 ENCOUNTER — HOSPITAL ENCOUNTER (OUTPATIENT)
Dept: MRI IMAGING | Facility: HOSPITAL | Age: 19
Discharge: HOME | End: 2024-07-10
Payer: COMMERCIAL

## 2024-07-10 DIAGNOSIS — H53.19 OTHER SUBJECTIVE VISUAL DISTURBANCES: ICD-10-CM

## 2024-07-10 DIAGNOSIS — G43.009 MIGRAINE WITHOUT AURA AND WITHOUT STATUS MIGRAINOSUS, NOT INTRACTABLE: ICD-10-CM

## 2024-07-10 PROCEDURE — 70551 MRI BRAIN STEM W/O DYE: CPT

## 2024-07-11 ENCOUNTER — TELEPHONE (OUTPATIENT)
Dept: NEUROLOGY | Facility: CLINIC | Age: 19
End: 2024-07-11

## 2024-07-11 RX ORDER — ATOGEPANT 60 MG/1
60 TABLET ORAL
Qty: 90 TABLET | Refills: 3 | Status: SHIPPED | OUTPATIENT
Start: 2024-07-11

## 2024-07-11 NOTE — TELEPHONE ENCOUNTER
I attempted to submit PA via rx benefits, prompt PA and patient not found; also attempted to call pharmacy for insurance information and was unable to connect to pharmacy staff to clarify. I called patient who confirmed she still has [employer].  I called [employer] and was told [employer] [phone] has prescription benefits    I spoke to [facility] who advised no PA for qulipta is needed and is only covered for 90 days supply as this is a maintenance medication (approx cost will be $60); covered at [employer]    Karina, Please sign script for 90 day supply in order for insurance to cover, thanks for your help.

## 2024-07-11 NOTE — TELEPHONE ENCOUNTER
Received via [employer] from  Denials Department request for review of lab services 5/13/24.  Request scanned into [email or web address].

## 2024-07-11 NOTE — TELEPHONE ENCOUNTER
Received message from [employer] below:    Outcome  Additional Information Required  Your PA Request has been closed. The  is not the PA processor for the drug requested. For further inquiries please contact the number on the back of the member's prescription card. (Message [id number]). - JOANNA, Pharmacy Technician 07/10/2024 02:03 PM      Will attempt to submit to [email or web address] on their website.    Prior Auth (EOC) ID:  [id number]     PA submitted. Awaiting determination.

## 2024-07-12 NOTE — TELEPHONE ENCOUNTER
Called patient to advise, reached vm, left detailed message that qulipta covered by insurance at approx cost of $60 for 90 day supply, please call pharmacy to fill and cb if needed.

## 2024-07-12 NOTE — TELEPHONE ENCOUNTER
Karina, please see communication thread and attached request for additional diagnosis codes.  Can you assist with providing these? Thanks for your help.

## 2024-07-15 NOTE — TELEPHONE ENCOUNTER
ALEJA Maxwell  You7 hours ago (9:10 AM)       I updated these diagnosis codes for the vitamin D and Thyroid panel in Epic.  Thank you!!

## 2024-07-18 NOTE — RESULT ENCOUNTER NOTE
Bunny Gomez,    I was able to review your brain MRI and report and it came back without any abnormalities.     Please let me know if you have any additional questions or concerns.    Jean Claude,  Karina

## 2024-09-03 ENCOUNTER — LAB (OUTPATIENT)
Dept: LAB | Facility: CLINIC | Age: 19
End: 2024-09-03
Payer: COMMERCIAL

## 2024-09-03 DIAGNOSIS — E61.1 IRON DEFICIENCY: ICD-10-CM

## 2024-09-03 DIAGNOSIS — E55.9 VITAMIN D DEFICIENCY: ICD-10-CM

## 2024-09-03 LAB
25(OH)D3 SERPL-MCNC: 41 NG/ML (ref 30–100)
FERRITIN SERPL-MCNC: 35 NG/ML (ref 11–307)
IRON SATN MFR SERPL: 61 % (ref 15–50)
IRON SERPL-MCNC: 248 UG/DL (ref 50–212)
TIBC SERPL-MCNC: 405 UG/DL (ref 250–450)
UIBC SERPL-MCNC: 157 UG/DL (ref 155–355)

## 2024-09-03 PROCEDURE — 82306 VITAMIN D 25 HYDROXY: CPT

## 2024-09-03 PROCEDURE — 83540 ASSAY OF IRON: CPT

## 2024-09-03 PROCEDURE — 36415 COLL VENOUS BLD VENIPUNCTURE: CPT

## 2024-09-03 PROCEDURE — 82728 ASSAY OF FERRITIN: CPT

## 2024-09-03 PROCEDURE — 83550 IRON BINDING TEST: CPT

## 2024-09-04 ENCOUNTER — TELEPHONE (OUTPATIENT)
Dept: NEUROLOGY | Facility: CLINIC | Age: 19
End: 2024-09-04

## 2024-09-04 DIAGNOSIS — E61.1 IRON DEFICIENCY: Primary | ICD-10-CM

## 2024-09-04 NOTE — RESULT ENCOUNTER NOTE
Your iron panel came back and your iron and iron saturation came back quite elevated.  I recommended taking the iron supplement every other day and we can re check your labs in another 2-3 months.  I will place the script for the bloodwork.     Your vitamin D is in a great range. I recommend continuing the plan.     Please let me know if you have additional questions or concerns.

## 2024-09-11 NOTE — PATIENT INSTRUCTIONS
Patient Instructions:    Additional Testing  -No additional testing required at this time.     Headache/migraine treatment:     Prevention  -To take every day to help prevent headaches - not to take at the time of headache:  -Continue Qulitpa 60mg daily  -May continue over the counter preventive supplements for headaches/migraines (if you try, try for 3 months straight):  -Magnesium 400mg daily (If any diarrhea or upset stomach, decrease dose as tolerated)  -Riboflavin (Vitamin B2) 400mg daily (may make your urine bright/neon yellow)  - All supplements can be purchased online    Abortive  -For immediate treatment of a headache/migraine  -For your more moderate to severe migraines take this medication as early as possible:  -Continue Maxalt. One tablet at the onset of a migraine.  May repeat again in 2 hours if not completely headache free.   -It is ok to take ibuprofen, acetaminophen or naproxen (Advil, Tylenol,  Aleve, Excedrin) if they help your headaches you should limit these to No more than 3 times a week to avoid medication overuse/rebound headaches.     Lifestyle Recommendations:    -Remain well-hydrated drinking at least 48 to 64 ounces of noncaffeinated beverages per day in addition to anything caffeinated.    -Getting adequate rest is also very important for migraine prevention (aim for 7-8 hours per night).     -Regular exercise is also beneficial for headache prevention.  I would encourage at the least 5 days of physical exercise weekly for at least 30 minutes.   -I would like for them to keep track of their migraines using an application on their phone or calendar as they see fit. Phone applications: Migraine Enrique or Migraine Diary.    Education and Follow-up:    -Please call or send a [employer] message with any questions or concerns. Please present to the emergency room with any concerning symptoms such as: worst headache of your life, sudden painless loss of vision or double vision, difficulty speaking  or swallowing, vertigo/room spinning that does not quickly resolve, or weakness/numbness/loss of coordination affecting 1 side of the face or body.  -Follow up in 4 months or sooner if needed.

## 2024-09-11 NOTE — PROGRESS NOTES
[facility]  PATIENT:  Patricia Flanagan  MRN:  [id number]  :  2005  DATE OF SERVICE:  2024  REFERRED BY: No ref. provider found  PCP: Shimon Sheikh MD    Assessment/Plan:     Migraine without aura and without status migrainosus, not intractable  Since her last office visit, she reports a decrease in the frequency and intensity of her migraine headaches after starting the Qulipta. She has only experienced 4 migraines since starting Qulipta (~ 2/month). Less head pressure in between headaches as well.  Maxalt is effective at getting rid of the headaches after 1 dose.  Usually takes about 2 hours.  She reports not being as hungry and possibly losing a little bit of weight and was unsure if it was related to the Qulipta.  She denies any nausea, vomiting, or diarrhea with this medication.  She is back in school and lives on campus.  Her vitamin D levels were low which we had supplemented with over-the-counter vitamin D.  Repeat testing came back within normal range.  She was replacing her iron with over-the-counter supplementation but her most recent lab values showed elevated iron levels.  She was recommended to decrease her iron supplement from daily to every other day.  We will recheck these levels prior to her next follow-up.  Workup:  No additional workup required at this time  Preventative:  We discussed headache hygiene and lifestyle factors that may improve headaches  Continue Qulipta 60 mg daily.  If patient continues to notice weight loss we could stop this medication for a few months and see if that her weight stabilizes.  Past/ failed/contraindicated: amitriptyline, topamax  Future options: propranolol,  CGRP med, botox  Acute:  Discussed not taking over-the-counter or prescription pain medications more than 3 days per week to prevent medication overuse/rebound headache  Continue Maxalt at the earliest onset of a migraine.  May repeat again in 2 hours if not completely  "headache free  Future options:  Triptan, ubrelvy, reyvow         Diagnoses and all orders for this visit:    Iron deficiency  -     Iron Panel (Includes Ferritin, Iron Sat%, Iron, and TIBC); Future    Migraine without aura and without status migrainosus, not intractable  -     rizatriptan (Maxalt) 10 mg tablet; 1 tab at onset of migraine; may repeat every 2 hours.  Take no more than 3 doses in a day.  -     Atogepant (Qulipta) 60 MG TABS; Take 60 mg by mouth daily at bedtime    Vitamin D deficiency  -     Vitamin D 25 hydroxy; Future           Patient should follow up in 4 months, or I would be happy to see her sooner if needed.  Patient should call the office or send a [employer] message with any questions or concerns.  Patient should present to the nearest emergency department with any concerning symptoms.     CC:     We had the pleasure of evaluating Patricia in neurological follow up today. she is a 19 y.o. year-old female who presents today for evaluation of headaches.     History obtained from patient as well as available medical record review.    History of Present Illness:     She was last seen by me on 4/4/24.  For review, \"This patient has a HX of Migraines and they are treated with Nurtec prescribed by the PCP. Patient stated that the medication is working well for her but she is still experiencing about 5 migraine headaches per month.   After roque, she mentioned to her mom that she was having some visual disturbances where her vision felt like it was shifting from left to right.  Denies blurry vision. Feel like the room is spinning when this happens but she has always been sitting down.  This has occurred about 10x since December and the episodes last less than 5 minutes.  She sometimes has these subjective visual disturbances without the head pain.  Mom has not been able to witness them so we are unsure if the eye darting is in fact nystagmus.  She is on the computer a lot and has a lot of screen time in " "general, so could be related to eye strain fatigue.  She saw the optometrist and they told her that her vision was good and they recommended eye drops for dry eye.  She is unsure if this has been helping with the symptoms.\"      OV with me 7/8/24: Since her last office visit, she is doing about the same.  Reports experiencing at least 5 migraines a month and at least one headache a week, which is the same frequency as her last office visit in April. She is still having the issues with her vision. Nurtec has been taking longer to work for her migraines, which have been lasting approximately 7 hours.  It has been approximately one month since the patient experienced the visual shifting sensation.  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 She was started on Qulipta for prevention and Maxalt for rescue.     Interval history as of 9/12/24:  Since her last office visit, she reports a decrease in the frequency and intensity of her migraine headaches after starting the Qulipta. She has only experienced 4 migraines since starting Qulipta (~ 2/month). Less head pressure in between headaches as well.  Maxalt is effective at getting rid of the headaches after 1 dose.  Usually takes about 2 hours.  She reports not being as hungry and possibly losing a little bit of weight and was unsure if it was related to the Qulipta.  She denies any nausea, vomiting, or diarrhea with this medication.  She is back in school and lives on campus.    Headaches started at what age? 14 years old.    How often do the headaches occur? Last visit 5 per month. Now, 2/month  What time of the day do the headaches start?  Can have them upon waking or they progress throughout the day.   How long do the headaches last? Headaches could last all day.   Are you ever headache free? No  Aura? without aura  Where is your headache located and pain quality? Stabbing pain on the top of her head can be left or " right sided.   What is the intensity of pain? Worst 9/10, Average: 5/10  Associated symptoms:   [x] Nausea       [] Vomiting        [] Diarrhea  [] Stiff or sore neck   [] Problems with concentration  [x] Photophobia     [x]Phonophobia      [] Osmophobia  [] Blurred vision   [x] Prefer quiet, dark room  [x] Light-headed or dizzy     [] Tinnitus   [] Hands or feet tingle or feel numb/paresthesias    [] Ptosis      [] Facial droop  [] Lacrimation  [] Nasal congestion/rhinorrhea      Things that make the headache worse? Standing up makes the pain worse.     Headache triggers:  none in particular. Sometimes around menstrual cycle.     Have you seen someone else for headaches or pain? Yes, PCP treating  Have you had trigger point injection performed and how often? No  Have you had Botox injection performed and how often? No   Have you had epidural injections or transforaminal injections performed? No  Are you current pregnant or planning on getting pregnant? No. On birth control  Have you ever had any Brain imaging? yes brain MRI    Last eye exam: optometry     What medications do you take or have you taken for your headaches?:    ABORTIVE:    OTC medications: none  Prescription: Maxalt (effective)  Past/failed/contraindicated: nurtec (ineffective)    PREVENTIVE:   OTC medications: Magnesium and B2  Prescription: Qulipta (effective)  Past/failed/contraindicated: Topamax (patient wasn't happy with side effects), amitriptyline (ineffective)    Lifestyle history/modifications:    Sleep   Averages: 7-8 hours   Problems falling asleep?:   Yes  Problems staying asleep?:  No  Physical activity: nothing routine. Walking on campus  Water: 40oz per day  Caffeine: 0 per day. Occasional soda  Mood: feel like she is doing well. Currently in college.     Pertinent family history:  Family history of headaches:  migraine headaches in mother and grandmother  Any family history of aneurysms - No    Pertinent social history:  Work: part  time on campus  Education: full time college student currently  Lives with a roommate in the dorms  Illicit Drugs: denies  Alcohol/tobacco: Denies alcohol use, Denies tobacco use    Past Medical History:     Past Medical History:   Diagnosis Date    Croup in child     Hives     resolved    Migraines     Seasonal allergies     Wears contact lenses      Past Surgical History:   Procedure Laterality Date    SD CYSTO BLADDER W/URETERAL CATHETERIZATION N/A 10/10/2022    Procedure: CYSTOSCOPY WITH INSERTION STENT URETERAL;  Surgeon: Jorge Hernandez MD;  [facility];  Service: Urology    SD LAPAROSCOPY SURG PYELOPLASTY Left 10/10/2022    Procedure: PYELOPLASTY LAPAROSCOPIC W/ ROBOTICS;  Surgeon: Jorge Hernandez MD;  [facility];  Service: Urology    SD LAPAROSCOPY SURG PYELOPLASTY Right 6/19/2023    Procedure: PYELOPLASTY LAPAROSCOPIC W/ ROBOTICS;  Surgeon: Jorge Hernandez MD;  [facility];  Service: Urology    URETERAL STENT PLACEMENT Right 6/19/2023    Procedure: INSERTION STENT URETERAL;  Surgeon: Jorge Hernandez MD;  [facility];  Service: Urology     Patient Active Problem List   Diagnosis    Ankle pain    Allergic rhinitis due to house dust mite    Chronic seasonal allergic rhinitis due to pollen    Allergic rhinitis due to cats    Chronic idiopathic urticaria    Hydronephrosis    Flank pain    UPJ obstruction, congenital    Injury to kidney    Allergic reaction    Migraine without aura and without status migrainosus, not intractable    Other subjective visual disturbances     Medications:     Current Outpatient Medications   Medication Sig Dispense Refill    adapalene (DIFFERIN) 0.1 % cream Apply 1 Application topically every other day      Atogepant (Qulipta) 60 MG TABS Take 60 mg by mouth daily at bedtime 90 tablet 3    ferrous sulfate 324 (65 Fe) mg Take 1 tablet (324 mg total) by mouth daily before breakfast 30 tablet 3    fluticasone (FLONASE) 50 mcg/act nasal spray 1 spray into  each nostril if needed      ketoconazole (NIZORAL) 2 % shampoo 2 (two) times a week      ketotifen (ZADITOR) 0.025 % ophthalmic solution Administer 1 drop to both eyes 2 (two) times a day as needed      Magnesium 200 MG TABS Take 200 mg by mouth in the morning      Multiple Vitamin (multivitamin) tablet Take 1 tablet by mouth daily      norethindrone-ethinyl estradiol (Junel FE 1/20) 1-20 MG-MCG per tablet Take 1 tablet by mouth daily 84 tablet 3    Riboflavin (B-2 PO) Take 1 tablet by mouth in the morning      rizatriptan (Maxalt) 10 mg tablet 1 tab at onset of migraine; may repeat every 2 hours.  Take no more than 3 doses in a day. 12 tablet 2    ergocalciferol (VITAMIN D2) 50,000 units Take 1 capsule (50,000 Units total) by mouth once a week For 6 weeks 6 capsule 0     No current facility-administered medications for this visit.      Allergies:     Allergies   Allergen Reactions    Skin Protectants, Misc. [Lanolin] Hives     Has local allergic reaction to skin GLUE     Family History:     Family History   Problem Relation Age of Onset    Sjogren's syndrome Mother     Hashimoto's thyroiditis Mother     Migraines Mother     Thyroid disease Mother     No Known Problems Father     No Known Problems Sister     Heart disease Maternal Grandfather     Diabetes Paternal Grandfather     Heart attack Paternal Grandfather     Heart disease Paternal Grandfather     Hypertension Paternal Grandfather     Cancer Paternal Grandmother      Social History:     Social History     Socioeconomic History    Marital status: Single     Spouse name: Not on file    Number of children: 0    Years of education: Not on file    Highest education level: Not on file   Occupational History    Occupation: student    Tobacco Use    Smoking status: Never     Passive exposure: Never    Smokeless tobacco: Never   Vaping Use    Vaping status: Never Used   Substance and Sexual Activity    Alcohol use: Never    Drug use: Never    Sexual activity: Never  "  Other Topics Concern    Not on file   Social History Narrative    Who lives in your home: Mom, Dad, and sister     What type of home do you live in: Single house    Age of your home: older 100+     How long have you been living there: 2004    Type of heat: Baseboard and Other pellet stove    Type of fuel: Electric and Pellets    What type of leander is in your bedroom: Carpet    Do you have the following in or near your home:    Air products: Window air conditioning, Humidifier and Dehumidifier    Pests: None    Pets: Dog and Other hermit crab X 2     Are pets allowed in bedroom: Yes    Open fields, wooded areas nearby: N/A    Basement: Wet, Damp, Musty and Unfinished    Exposure to second hand smoke: No        Habits:    Caffeine: soda 2 times a week    Chocolate: daily     Other:     Social Determinants of Health     Financial Resource Strain: Not on file   Food Insecurity: Not on file   Transportation Needs: Not on file   Physical Activity: Insufficiently Active (12/16/2021)    Exercise Vital Sign     Days of Exercise per Week: 2 days     Minutes of Exercise per Session: 50 min   Stress: Not on file   Social Connections: Not on file   Intimate Partner Violence: Not on file   Housing Stability: Not on file     Objective:     Physical Exam:  Vitals:  /74 (BP Location: Right arm, Patient Position: Sitting, Cuff Size: Standard)   Pulse 81   Temp 98.1 °F (36.7 °C) (Temporal)   Ht 5' 7\" (1.702 m)   Wt 48.5 kg (107 lb)   BMI 16.76 kg/m²   BP Readings from Last 3 Encounters:   09/12/24 107/74   07/08/24 100/78   05/13/24 126/74     Pulse Readings from Last 3 Encounters:   09/12/24 81   07/08/24 76   04/04/24 94     On neurological examination the patient was awake, alert, attentive, oriented to person, place, and time. Recent and remote memory intact to conversation with no evidence of language dysfunction. Satisfactory fund of knowledge. Normal attention span and concentration.  Mood, affect and judgement " are appropriate. Speech is fluent without dysarthria or aphasia. Face appears symmetric, with no obvious weakness noted.  Audition is intact to casual conversation.  Eye movements are intact.  Able to move bilateral upper extremities antigravity without difficulty.      Pertinent lab results:   Lab Results   Component Value Date    SODIUM 139 2024    K 4.0 2024     2024    CO2 26 2024    AGAP 10 2024    BUN 14 2024    CREATININE 0.70 2024    GLUC 84 2023    GLUF 81 2024    CALCIUM 9.0 2024    AST 15 2024    ALT 12 2024    ALKPHOS 73 2024    TP 7.5 2024    TBILI 0.31 2024    EGFR 125 2024     Lab Results   Component Value Date    WBC 3.79 (L) 2024    HGB 13.4 2024    HCT 41.4 2024    MCV 90 2024     2024     Lab Results   Component Value Date    UBN3XDDGNHKP 1.231 2024     Lab Results   Component Value Date    IRON 248 (H) 2024    TIBC 405 2024    FERRITIN 35 2024     9/3/24 Vitamin D  41.0     Pertinent Imagin/10/24 MRI Brain: No abnormality evident.   21 MRI Brain wo contrast: Normal brain MRI.      Review of Systems:     Review of Systems   Constitutional:  Negative for appetite change and fever.   HENT: Negative.  Negative for hearing loss, tinnitus, trouble swallowing and voice change.    Eyes:  Positive for photophobia and visual disturbance (vision shifting). Negative for pain.   Respiratory: Negative.  Negative for shortness of breath.    Cardiovascular: Negative.  Negative for palpitations.   Gastrointestinal: Negative.  Negative for nausea and vomiting.   Endocrine: Negative.  Negative for cold intolerance.   Genitourinary: Negative.  Negative for dysuria, frequency and urgency.   Musculoskeletal: Negative.  Negative for myalgias and neck pain.   Skin: Negative.  Negative for rash.   Neurological:  Positive for headaches. Negative for  dizziness, tremors, seizures, syncope, facial asymmetry, speech difficulty, weakness, light-headedness and numbness.   Hematological: Negative.  Does not bruise/bleed easily.   Psychiatric/Behavioral:  Positive for sleep disturbance (trouble staying asleep). Negative for confusion and hallucinations.    All other systems reviewed and are negative.     Reviewed ROS as entered by medical assistant.     I have spent a total time of 35 minutes on 09/12/24 in caring for this patient including Diagnostic results, Prognosis, Risks and benefits of tx options, Instructions for management, Patient and family education, Importance of tx compliance, Risk factor reductions, Impressions, Counseling / Coordination of care, Documenting in the medical record, Reviewing / ordering tests, medicine, procedures  , and Obtaining or reviewing history  .       Author:  ALEJA Maxwell 9/12/2024 10:55 AM

## 2024-09-12 ENCOUNTER — OFFICE VISIT (OUTPATIENT)
Dept: NEUROLOGY | Facility: CLINIC | Age: 19
End: 2024-09-12
Payer: COMMERCIAL

## 2024-09-12 VITALS
DIASTOLIC BLOOD PRESSURE: 74 MMHG | BODY MASS INDEX: 16.79 KG/M2 | TEMPERATURE: 98.1 F | HEIGHT: 67 IN | SYSTOLIC BLOOD PRESSURE: 107 MMHG | WEIGHT: 107 LBS | HEART RATE: 81 BPM

## 2024-09-12 DIAGNOSIS — E61.1 IRON DEFICIENCY: Primary | ICD-10-CM

## 2024-09-12 DIAGNOSIS — E55.9 VITAMIN D DEFICIENCY: ICD-10-CM

## 2024-09-12 DIAGNOSIS — G43.009 MIGRAINE WITHOUT AURA AND WITHOUT STATUS MIGRAINOSUS, NOT INTRACTABLE: ICD-10-CM

## 2024-09-12 PROCEDURE — 99214 OFFICE O/P EST MOD 30 MIN: CPT

## 2024-09-12 RX ORDER — ATOGEPANT 60 MG/1
60 TABLET ORAL
Qty: 90 TABLET | Refills: 3 | Status: SHIPPED | OUTPATIENT
Start: 2024-09-12

## 2024-09-12 RX ORDER — RIZATRIPTAN BENZOATE 10 MG/1
TABLET ORAL
Qty: 12 TABLET | Refills: 2 | Status: SHIPPED | OUTPATIENT
Start: 2024-09-12

## 2024-09-12 NOTE — PROGRESS NOTES
Review of Systems   Constitutional:  Negative for appetite change and fever.   HENT: Negative.  Negative for hearing loss, tinnitus, trouble swallowing and voice change.    Eyes:  Positive for photophobia and visual disturbance (vision shifting). Negative for pain.   Respiratory: Negative.  Negative for shortness of breath.    Cardiovascular: Negative.  Negative for palpitations.   Gastrointestinal: Negative.  Negative for nausea and vomiting.   Endocrine: Negative.  Negative for cold intolerance.   Genitourinary: Negative.  Negative for dysuria, frequency and urgency.   Musculoskeletal: Negative.  Negative for myalgias and neck pain.   Skin: Negative.  Negative for rash.   Neurological:  Positive for headaches. Negative for dizziness, tremors, seizures, syncope, facial asymmetry, speech difficulty, weakness, light-headedness and numbness.   Hematological: Negative.  Does not bruise/bleed easily.   Psychiatric/Behavioral:  Positive for sleep disturbance (trouble staying asleep). Negative for confusion and hallucinations.    All other systems reviewed and are negative.

## 2024-09-12 NOTE — ASSESSMENT & PLAN NOTE
Since her last office visit, she reports a decrease in the frequency and intensity of her migraine headaches after starting the Qulipta. She has only experienced 4 migraines since starting Qulipta (~ 2/month). Less head pressure in between headaches as well.  Maxalt is effective at getting rid of the headaches after 1 dose.  Usually takes about 2 hours.  She reports not being as hungry and possibly losing a little bit of weight and was unsure if it was related to the Qulipta.  She denies any nausea, vomiting, or diarrhea with this medication.  She is back in school and lives [place] campus.  Her vitamin D levels were low which we had supplemented with over-the-counter vitamin D.  Repeat testing came back within normal range.  She was replacing her iron with over-the-counter supplementation but her most recent lab values showed elevated iron levels.  She was recommended to decrease her iron supplement from daily to every other day.  We will recheck these levels prior to her next follow-up.  Workup:  No additional workup required at this time  Preventative:  We discussed headache hygiene and lifestyle factors that may improve headaches  Continue Qulipta 60 mg daily.  If patient continues to notice weight loss we could stop this medication for a few months and see if that her weight stabilizes.  Past/ failed/contraindicated: amitriptyline, topamax  Future options: propranolol,  CGRP med, botox  Acute:  Discussed not taking over-the-counter or prescription pain medications more than 3 days per week to prevent medication overuse/rebound headache  Continue Maxalt at the earliest onset of a migraine.  May repeat again in 2 hours if not completely headache free  Future options:  Triptan, ubrelvy, reyvow

## 2024-09-24 ENCOUNTER — APPOINTMENT (OUTPATIENT)
Dept: LAB | Facility: MEDICAL CENTER | Age: 19
End: 2024-09-24
Payer: COMMERCIAL

## 2024-09-24 DIAGNOSIS — R55 SYNCOPE, UNSPECIFIED SYNCOPE TYPE: ICD-10-CM

## 2024-09-24 DIAGNOSIS — R53.83 OTHER FATIGUE: ICD-10-CM

## 2024-09-24 LAB
ALBUMIN SERPL BCG-MCNC: 4.4 G/DL (ref 3.5–5)
ALP SERPL-CCNC: 53 U/L (ref 34–104)
ALT SERPL W P-5'-P-CCNC: 8 U/L (ref 7–52)
ANION GAP SERPL CALCULATED.3IONS-SCNC: 7 MMOL/L (ref 4–13)
AST SERPL W P-5'-P-CCNC: 14 U/L (ref 13–39)
BACTERIA UR QL AUTO: ABNORMAL /HPF
BASOPHILS # BLD AUTO: 0.05 THOUSANDS/ΜL (ref 0–0.1)
BASOPHILS NFR BLD AUTO: 1 % (ref 0–1)
BILIRUB SERPL-MCNC: 0.46 MG/DL (ref 0.2–1)
BILIRUB UR QL STRIP: NEGATIVE
BUN SERPL-MCNC: 11 MG/DL (ref 5–25)
CALCIUM SERPL-MCNC: 9.3 MG/DL (ref 8.4–10.2)
CHLORIDE SERPL-SCNC: 102 MMOL/L (ref 96–108)
CLARITY UR: CLEAR
CO2 SERPL-SCNC: 27 MMOL/L (ref 21–32)
COLOR UR: YELLOW
CREAT SERPL-MCNC: 0.68 MG/DL (ref 0.6–1.3)
EOSINOPHIL # BLD AUTO: 0.07 THOUSAND/ΜL (ref 0–0.61)
EOSINOPHIL NFR BLD AUTO: 1 % (ref 0–6)
ERYTHROCYTE [DISTWIDTH] IN BLOOD BY AUTOMATED COUNT: 12.6 % (ref 11.6–15.1)
ERYTHROCYTE [SEDIMENTATION RATE] IN BLOOD: 16 MM/HOUR (ref 0–19)
GFR SERPL CREATININE-BSD FRML MDRD: 126 ML/MIN/1.73SQ M
GLUCOSE SERPL-MCNC: 79 MG/DL (ref 65–140)
GLUCOSE UR STRIP-MCNC: NEGATIVE MG/DL
HCT VFR BLD AUTO: 40.6 % (ref 34.8–46.1)
HGB BLD-MCNC: 13.3 G/DL (ref 11.5–15.4)
HGB UR QL STRIP.AUTO: ABNORMAL
IMM GRANULOCYTES # BLD AUTO: 0 THOUSAND/UL (ref 0–0.2)
IMM GRANULOCYTES NFR BLD AUTO: 0 % (ref 0–2)
KETONES UR STRIP-MCNC: NEGATIVE MG/DL
LEUKOCYTE ESTERASE UR QL STRIP: ABNORMAL
LYMPHOCYTES # BLD AUTO: 1.65 THOUSANDS/ΜL (ref 0.6–4.47)
LYMPHOCYTES NFR BLD AUTO: 34 % (ref 14–44)
MCH RBC QN AUTO: 30.1 PG (ref 26.8–34.3)
MCHC RBC AUTO-ENTMCNC: 32.8 G/DL (ref 31.4–37.4)
MCV RBC AUTO: 92 FL (ref 82–98)
MONOCYTES # BLD AUTO: 0.36 THOUSAND/ΜL (ref 0.17–1.22)
MONOCYTES NFR BLD AUTO: 7 % (ref 4–12)
MUCOUS THREADS UR QL AUTO: ABNORMAL
NEUTROPHILS # BLD AUTO: 2.78 THOUSANDS/ΜL (ref 1.85–7.62)
NEUTS SEG NFR BLD AUTO: 57 % (ref 43–75)
NITRITE UR QL STRIP: NEGATIVE
NON-SQ EPI CELLS URNS QL MICRO: ABNORMAL /HPF
NRBC BLD AUTO-RTO: 0 /100 WBCS
PH UR STRIP.AUTO: 6 [PH]
PLATELET # BLD AUTO: 311 THOUSANDS/UL (ref 149–390)
PMV BLD AUTO: 10.8 FL (ref 8.9–12.7)
POTASSIUM SERPL-SCNC: 4 MMOL/L (ref 3.5–5.3)
PROT SERPL-MCNC: 7.5 G/DL (ref 6.4–8.4)
PROT UR STRIP-MCNC: ABNORMAL MG/DL
RBC # BLD AUTO: 4.42 MILLION/UL (ref 3.81–5.12)
RBC #/AREA URNS AUTO: ABNORMAL /HPF
SODIUM SERPL-SCNC: 136 MMOL/L (ref 135–147)
SP GR UR STRIP.AUTO: 1.02 (ref 1–1.03)
TSH SERPL DL<=0.05 MIU/L-ACNC: 1.47 UIU/ML (ref 0.45–4.5)
UROBILINOGEN UR STRIP-ACNC: <2 MG/DL
WBC # BLD AUTO: 4.91 THOUSAND/UL (ref 4.31–10.16)
WBC #/AREA URNS AUTO: ABNORMAL /HPF

## 2024-09-24 PROCEDURE — 85025 COMPLETE CBC W/AUTO DIFF WBC: CPT

## 2024-09-24 PROCEDURE — 80053 COMPREHEN METABOLIC PANEL: CPT

## 2024-09-24 PROCEDURE — 36415 COLL VENOUS BLD VENIPUNCTURE: CPT

## 2024-09-24 PROCEDURE — 86664 EPSTEIN-BARR NUCLEAR ANTIGEN: CPT

## 2024-09-24 PROCEDURE — 84443 ASSAY THYROID STIM HORMONE: CPT

## 2024-09-24 PROCEDURE — 86645 CMV ANTIBODY IGM: CPT

## 2024-09-24 PROCEDURE — 86663 EPSTEIN-BARR ANTIBODY: CPT

## 2024-09-24 PROCEDURE — 85652 RBC SED RATE AUTOMATED: CPT

## 2024-09-24 PROCEDURE — 81001 URINALYSIS AUTO W/SCOPE: CPT

## 2024-09-24 PROCEDURE — 86665 EPSTEIN-BARR CAPSID VCA: CPT

## 2024-09-24 PROCEDURE — 86644 CMV ANTIBODY: CPT

## 2024-09-25 LAB
CMV IGG SERPL QL IA: NEGATIVE
CMV IGM SERPL QL IA: NEGATIVE
EBV EA IGG SER QL IA: NEGATIVE
EBV NA IGG SER QL IA: NEGATIVE
EBV VCA IGG SER QL IA: NEGATIVE
EBV VCA IGM SER QL IA: NEGATIVE

## 2024-11-27 ENCOUNTER — HOSPITAL ENCOUNTER (OUTPATIENT)
Dept: ULTRASOUND IMAGING | Facility: HOSPITAL | Age: 19
Discharge: HOME | End: 2024-11-27
Payer: COMMERCIAL

## 2024-11-27 DIAGNOSIS — Q62.39 UPJ OBSTRUCTION, CONGENITAL: ICD-10-CM

## 2024-11-27 PROCEDURE — 76775 US EXAM ABDO BACK WALL LIM: CPT

## 2024-12-18 ENCOUNTER — OFFICE VISIT (OUTPATIENT)
Dept: UROLOGY | Facility: CLINIC | Age: 19
End: 2024-12-18
Payer: COMMERCIAL

## 2024-12-18 VITALS
OXYGEN SATURATION: 98 % | DIASTOLIC BLOOD PRESSURE: 68 MMHG | HEART RATE: 93 BPM | SYSTOLIC BLOOD PRESSURE: 112 MMHG | WEIGHT: 103 LBS | BODY MASS INDEX: 16.17 KG/M2 | HEIGHT: 67 IN

## 2024-12-18 DIAGNOSIS — Q62.39 UPJ OBSTRUCTION, CONGENITAL: Primary | ICD-10-CM

## 2024-12-18 PROCEDURE — 99213 OFFICE O/P EST LOW 20 MIN: CPT | Performed by: PHYSICIAN ASSISTANT

## 2024-12-18 NOTE — PROGRESS NOTES
UROLOGY PROGRESS NOTE   Patient Identifiers: Patricia Flanagan (MRN [id number])  Date of Service: 12/18/2024    Subjective:   19-year-old female history of bilateral UPJ obstruction.  She had her first dismembered pyeloplasty in October 2022 and her second procedure was in June 2023.  She returns for follow-up a year after her last visit.  She reports no back pain no UTI.  Her kidney function remains normal.  She had a kidney and bladder ultrasound which showed expected redundancy of the renal pelvises after pyeloplasty with improvement of bilateral hydronephrosis.  A small stable cyst measuring 1.5 cm in the left lower pole with no other findings.  She is a college student and has no reported problems or complaints.    Reason for visit: Bilateral UPJ obstruction follow-up    Objective:     VITALS:    Vitals:    12/18/24 1121   BP: 112/68   Pulse: 93   SpO2: 98%           LABS:  Lab Results   Component Value Date    HGB 13.3 09/24/2024    HCT 40.6 09/24/2024    WBC 4.91 09/24/2024     09/24/2024   ]    Lab Results   Component Value Date    K 4.0 09/24/2024     09/24/2024    CO2 27 09/24/2024    BUN 11 09/24/2024    CREATININE 0.68 09/24/2024    CALCIUM 9.3 09/24/2024   ]        INPATIENT MEDS:    Current Outpatient Medications:     adapalene (DIFFERIN) 0.1 % cream, Apply 1 Application topically every other day, Disp: , Rfl:     Atogepant (Qulipta) 60 MG TABS, Take 60 mg by mouth daily at bedtime, Disp: 90 tablet, Rfl: 3    ergocalciferol (VITAMIN D2) 50,000 units, Take 1 capsule (50,000 Units total) by mouth once a week For 6 weeks, Disp: 6 capsule, Rfl: 0    fluticasone (FLONASE) 50 mcg/act nasal spray, 1 spray into each nostril if needed, Disp: , Rfl:     ketoconazole (NIZORAL) 2 % shampoo, 2 (two) times a week, Disp: , Rfl:     ketotifen (ZADITOR) 0.025 % ophthalmic solution, Administer 1 drop to both eyes 2 (two) times a day as needed, Disp: , Rfl:     Magnesium 200 MG TABS, Take 200 mg by mouth in the  "morning, Disp: , Rfl:     Multiple Vitamin (multivitamin) tablet, Take 1 tablet by mouth daily, Disp: , Rfl:     norethindrone-ethinyl estradiol (Junel FE 1/20) 1-20 MG-MCG per tablet, Take 1 tablet by mouth daily, Disp: 84 tablet, Rfl: 3    Riboflavin (B-2 PO), Take 1 tablet by mouth in the morning, Disp: , Rfl:     rizatriptan (Maxalt) 10 mg tablet, 1 tab at onset of migraine; may repeat every 2 hours.  Take no more than 3 doses in a day., Disp: 12 tablet, Rfl: 2    ferrous sulfate 324 (65 Fe) mg, Take 1 tablet (324 mg total) by mouth daily before breakfast (Patient not taking: Reported on 12/18/2024), Disp: 30 tablet, Rfl: 3      Physical Exam:   /68 (BP Location: Left arm, Patient Position: Sitting, Cuff Size: Standard)   Pulse 93   Ht 5' 7\" (1.702 m)   Wt 46.7 kg (103 lb)   SpO2 98%   BMI 16.13 kg/m²   GEN: no acute distress    RESP: breathing comfortably with no accessory muscle use    ABD: soft, non-tender, non-distended   INCISION:    EXT: no significant peripheral edema       RADIOLOGY:   IMPRESSION:     1.  Expected redundancy of the renal pelvises after pyeloplasty with significant improvement in bilateral hydronephrosis.     2.  Left lower pole cystic structure measures 1.5 cm and appears stable, potentially focally distended calyx from infundibular stenosis versus peripelvic cyst. No suspicious renal masses.    Assessment:   #1.  Bilateral UPJ obstruction with bilateral robotic dismembered pyeloplasty    Plan:   -I reviewed the findings with Patricia and her mother  -She has been stable with no complaints since her second surgery  -May follow-up with urology on as-needed basis and call with any future questions or concerns  -          "

## 2025-01-31 ENCOUNTER — TELEPHONE (OUTPATIENT)
Age: 20
End: 2025-01-31

## 2025-01-31 NOTE — TELEPHONE ENCOUNTER
Patricia Flanagan to [facility] (supporting ALEJA Maxwell)   HF      1/31/25 12:58 PM  Estela Collins!  I’m trying to refill my qulipta medication at [facility], but they are saying I need insurance approval. I didn’t know if there was anything you could do. Please let me know!     Thank you,   Patricia

## 2025-02-03 NOTE — TELEPHONE ENCOUNTER
"Prior auth submitted through [email or web address] website. While submitting the PA, the website \"timed out\". Called [employer] to follow up. Spoke with Jabari. Jabari confirmed that the PA was received. Faxed last OV note to #[phone]. EOC# [id number]. Pt's ID# [id number]. Awaiting determination.  "

## 2025-02-04 NOTE — TELEPHONE ENCOUNTER
Patient's mother called in to follow up on the PA being done for her daughter's medication because she is almost out and didn't realize she needed a PA. I advised her she could contact the insurance company and see if they can look in to the PA quicker and put it as a high priority and if worse comes to worse the doctor could send in something else for her, but it may cancel out the PA. She verbalized understanding and is going to try contacting the insurance company and call us back if she has any issues.

## 2025-02-04 NOTE — TELEPHONE ENCOUNTER
Patients mom called and stated she spoke with [facility]   Who stated no PA was submitted and provided the number that needs to be used to submitt -[phone] and or   [employer]       Izabela Flanagan (Mother)  [phone] (Mobile)  Please call mom to discuss further patient has no medication left.and mom is concerned about the timing of PA

## 2025-02-04 NOTE — TELEPHONE ENCOUNTER
Called [facility] and spoke with Annabel. Was advised that [facility] does not handle the prior authorizations for this pt, this needs to be done through [employer]. Prior auth was submitted online through [employer] and the determination is pending.    Called [employer] and spoke with Jose Manuel. Requested that the prior auth be marked as urgent as pt is currently out of medication. Jose Manuel was able to eze the prior auth as urgent and send it to the clinical team for an urgent review.

## 2025-02-05 NOTE — TELEPHONE ENCOUNTER
Qulipta is approved through 2/3/2026. Approval letter is scanned under the media tab. Called [facility] and left a message for the pharmacist making them aware of the approval. Called pt's mother, Izabela and made her aware.

## 2025-02-17 ENCOUNTER — TELEPHONE (OUTPATIENT)
Dept: NEUROLOGY | Facility: CLINIC | Age: 20
End: 2025-02-17

## 2025-02-21 ENCOUNTER — APPOINTMENT (OUTPATIENT)
Dept: LAB | Facility: CLINIC | Age: 20
End: 2025-02-21
Payer: COMMERCIAL

## 2025-02-21 DIAGNOSIS — E55.9 VITAMIN D DEFICIENCY: ICD-10-CM

## 2025-02-21 DIAGNOSIS — E61.1 IRON DEFICIENCY: ICD-10-CM

## 2025-02-21 LAB
25(OH)D3 SERPL-MCNC: 46.5 NG/ML (ref 30–100)
BASOPHILS # BLD AUTO: 0.05 THOUSANDS/ΜL (ref 0–0.1)
BASOPHILS NFR BLD AUTO: 1 % (ref 0–1)
EOSINOPHIL # BLD AUTO: 0.07 THOUSAND/ΜL (ref 0–0.61)
EOSINOPHIL NFR BLD AUTO: 1 % (ref 0–6)
ERYTHROCYTE [DISTWIDTH] IN BLOOD BY AUTOMATED COUNT: 12.4 % (ref 11.6–15.1)
FERRITIN SERPL-MCNC: 27 NG/ML (ref 11–307)
HCT VFR BLD AUTO: 39.4 % (ref 34.8–46.1)
HGB BLD-MCNC: 12.7 G/DL (ref 11.5–15.4)
IMM GRANULOCYTES # BLD AUTO: 0.01 THOUSAND/UL (ref 0–0.2)
IMM GRANULOCYTES NFR BLD AUTO: 0 % (ref 0–2)
IRON SATN MFR SERPL: 29 % (ref 15–50)
IRON SERPL-MCNC: 119 UG/DL (ref 50–212)
LYMPHOCYTES # BLD AUTO: 1.72 THOUSANDS/ΜL (ref 0.6–4.47)
LYMPHOCYTES NFR BLD AUTO: 29 % (ref 14–44)
MCH RBC QN AUTO: 29.4 PG (ref 26.8–34.3)
MCHC RBC AUTO-ENTMCNC: 32.2 G/DL (ref 31.4–37.4)
MCV RBC AUTO: 91 FL (ref 82–98)
MONOCYTES # BLD AUTO: 0.4 THOUSAND/ΜL (ref 0.17–1.22)
MONOCYTES NFR BLD AUTO: 7 % (ref 4–12)
NEUTROPHILS # BLD AUTO: 3.77 THOUSANDS/ΜL (ref 1.85–7.62)
NEUTS SEG NFR BLD AUTO: 62 % (ref 43–75)
NRBC BLD AUTO-RTO: 0 /100 WBCS
PLATELET # BLD AUTO: 295 THOUSANDS/UL (ref 149–390)
PMV BLD AUTO: 10.1 FL (ref 8.9–12.7)
RBC # BLD AUTO: 4.32 MILLION/UL (ref 3.81–5.12)
TIBC SERPL-MCNC: 417.2 UG/DL (ref 250–450)
TRANSFERRIN SERPL-MCNC: 298 MG/DL (ref 203–362)
UIBC SERPL-MCNC: 298 UG/DL (ref 155–355)
WBC # BLD AUTO: 6.02 THOUSAND/UL (ref 4.31–10.16)

## 2025-02-21 PROCEDURE — 83550 IRON BINDING TEST: CPT

## 2025-02-21 PROCEDURE — 36415 COLL VENOUS BLD VENIPUNCTURE: CPT

## 2025-02-21 PROCEDURE — 82306 VITAMIN D 25 HYDROXY: CPT

## 2025-02-21 PROCEDURE — 83540 ASSAY OF IRON: CPT

## 2025-02-21 PROCEDURE — 85025 COMPLETE CBC W/AUTO DIFF WBC: CPT

## 2025-02-21 PROCEDURE — 82728 ASSAY OF FERRITIN: CPT

## 2025-02-24 ENCOUNTER — RESULTS FOLLOW-UP (OUTPATIENT)
Dept: NEUROLOGY | Facility: CLINIC | Age: 20
End: 2025-02-24

## 2025-02-24 NOTE — PROGRESS NOTES
"[facility]  PATIENT:  Patricia Flanagan  MRN:  [id number]  :  2005  DATE OF SERVICE:  2025  REFERRED BY: No ref. provider found  PCP: Shimon Sheikh MD    Assessment/Plan:     Migraine without aura and without status migrainosus, not intractable  Since her last visit, she continues to take Qulipta for prevention which has been effective for her.  She missed a few doses secondary to not being able to  the prescription (insurance?) and noticed an increase in her migraine frequency until she started it back up.  She reported 0 migraines in January. Had 3 migraines in feb since missing some doses of the qulipta. She has not had any visual disturbances since around October.  She reports that she is living in a villa at school and is now able to cook for herself, so she believes her diet had been healthier than before which may be helping with her headache frequency as well.  Maxalt is effective for her but she experiences drowsiness and feels a little \"out of it\" when she takes this medication.  She experiences mild nausea with her migraines but doesn't feel that it is too troublesome at this juncture.    Workup:  7/10/24 MRI Brain: normal  24 Ophthalmology evaluation (under media)  Additional testing:   No additional workup required at this time  Preventative:  We discussed headache hygiene and lifestyle factors that may improve headaches  Continue Qulipta 60 mg daily.  If patient continues to notice weight loss we could stop this medication for a few months and see if that her weight stabilizes  Continue Magnesium and may add B2 at 400mg each daily  Past/ failed/contraindicated: amitriptyline, topamax  Future options: propranolol, CGRP med, botox  Acute:  Discussed not taking over-the-counter or prescription pain medications more than 3 days per week to prevent medication overuse/rebound headache  Continue Maxalt but decrease to 5mg at the earliest onset of a migraine.  May " "repeat again in 2 hours if not completely headache free. If this is ineffective or you still experience side effects, we discussed trying ubrelvy  Future options:  Triptan, ubrelvy, reyvow       Diagnoses and all orders for this visit:    Migraine without aura and without status migrainosus, not intractable  -     rizatriptan (MAXALT) 5 mg tablet; Take 1 tablet (5 mg total) by mouth as needed for migraine Take at the onset of migraine; if symptoms continue or return, may take another dose at least 2 hours after first dose. Take no more than 2 doses in a day.           CC:     We had the pleasure of evaluating Patricia in neurological follow up today. She is a 19 y.o. year-old female who presents today for evaluation of headaches.     History obtained from patient as well as available medical record review.    History of Present Illness:     She was last seen by me on 4/4/24.  For review, \"This patient has a HX of Migraines and they are treated with Nurtec prescribed by the PCP. Patient stated that the medication is working well for her but she is still experiencing about 5 migraine headaches per month.   After roque, she mentioned to her mom that she was having some visual disturbances where her vision felt like it was shifting from left to right.  Denies blurry vision. Feel like the room is spinning when this happens but she has always been sitting down.  This has occurred about 10x since December and the episodes last less than 5 minutes.  She sometimes has these subjective visual disturbances without the head pain.  Mom has not been able to witness them so we are unsure if the eye darting is in fact nystagmus.  She is on the computer a lot and has a lot of screen time in general, so could be related to eye strain fatigue.  She saw the optometrist and they told her that her vision was good and they recommended eye drops for dry eye.  She is unsure if this has been helping with the symptoms.\"      OV with me " 7/8/24: Since her last office visit, she is doing about the same.  Reports experiencing at least 5 migraines a month and at least one headache a week, which is the same frequency as her last office visit in April. She is still having the issues with her vision. Nurtec has been taking longer to work for her migraines, which have been lasting approximately 7 hours.  It has been approximately one month since the patient experienced the visual shifting sensation.  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 She was started on Qulipta for prevention and Maxalt for rescue.   OV with me 9/12/24:Since her last office visit, she reports a decrease in the frequency and intensity of her migraine headaches after starting the Qulipta. She has only experienced 4 migraines since starting Qulipta (~ 2/month). Less head pressure in between headaches as well.  Maxalt is effective at getting rid of the headaches after 1 dose.  Usually takes about 2 hours.  She reports not being as hungry and possibly losing a little bit of weight and was unsure if it was related to the Qulipta.  She denies any nausea, vomiting, or diarrhea with this medication.  She is back in school and lives on campus.    Interval history as of 2/25/25:  Since her last visit, she continues to take Qulipta for prevention which has been effective for her.  She missed a few doses secondary to not being able to  the prescription (insurance?) and noticed an increase in her migraine frequency until she started it back up.  She reported 0 migraines in January. Had 3 migraines in feb since missing some doses of the qulipta. She has not had any visual disturbances since around October.  She reports that she is living in a villa at school and is now able to cook for herself, so she believes her diet had been healthier than before which may be helping with her headache frequency as well.  Maxalt is effective for her  "but she experiences drowsiness and feels a little \"out of it\" when she takes this medication.  She experiences mild nausea with her migraines but doesn't feel that it is too troublesome at this juncture.      Headaches started at what age? 14 years old.    How often do the headaches occur? 5 per month. Last visit 2/month. Now, 2-3 per month  What time of the day do the headaches start?  Can have them upon waking or they progress throughout the day.   How long do the headaches last? Headaches could last all day.   Are you ever headache free? No  Aura? without aura  Where is your headache located and pain quality? Stabbing pain on the top of her head can be left or right sided.   What is the intensity of pain? Worst 9/10, Average: 5/10  Associated symptoms:   [x] Nausea       [] Vomiting        [] Diarrhea  [] Stiff or sore neck   [] Problems with concentration  [x] Photophobia     [x]Phonophobia      [] Osmophobia  [] Blurred vision   [x] Prefer quiet, dark room  [x] Light-headed or dizzy     [] Tinnitus   [] Hands or feet tingle or feel numb/paresthesias    [] Ptosis      [] Facial droop  [] Lacrimation  [] Nasal congestion/rhinorrhea      Things that make the headache worse? Standing up makes the pain worse.     Headache triggers:  none in particular. Sometimes around menstrual cycle.     Have you seen someone else for headaches or pain? Yes, PCP treating  Have you had trigger point injection performed and how often? No  Have you had Botox injection performed and how often? No   Have you had epidural injections or transforaminal injections performed? No  Are you current pregnant or planning on getting pregnant? No. On birth control  Have you ever had any Brain imaging? Yes brain MRI    Last eye exam: Optometry in December    What medications do you take or have you taken for your headaches?:    ABORTIVE:    OTC medications: none  Prescription: Maxalt (effective)  Past/failed/contraindicated: nurtec " (ineffective)    PREVENTIVE:   OTC medications: Magnesium and B2  Prescription: Qulipta (effective)  Past/failed/contraindicated: Topamax (side effects), amitriptyline (ineffective)    Lifestyle history/modifications:    Sleep   Averages: 7-8 hours   Problems falling asleep?:   Yes  Problems staying asleep?:  No  Physical activity: nothing routine. Walking on campus  Water: 40oz per day  Caffeine: 0 per day. Occasional soda  Mood: feel like she is doing well. Currently in college.     Pertinent family history:  Family history of headaches:  migraine headaches in mother and grandmother  Any family history of aneurysms - No    Pertinent social history:  Work: part time on campus  Education: full time college student currently  Lives with a roommate in the dorms  Illicit Drugs: denies  Alcohol/tobacco: Denies alcohol use, Denies tobacco use    Past Medical History:     Past Medical History:   Diagnosis Date    Croup in child     Hives     resolved    Migraines     Seasonal allergies     Wears contact lenses      Patient Active Problem List   Diagnosis    Ankle pain    Allergic rhinitis due to house dust mite    Chronic seasonal allergic rhinitis due to pollen    Allergic rhinitis due to cats    Chronic idiopathic urticaria    Hydronephrosis    Flank pain    UPJ obstruction, congenital    Injury to kidney    Allergic reaction    Migraine without aura and without status migrainosus, not intractable    Other subjective visual disturbances     Medications:     Current Outpatient Medications   Medication Sig Dispense Refill    adapalene (DIFFERIN) 0.1 % cream Apply 1 Application topically every other day      Atogepant (Qulipta) 60 MG TABS Take 60 mg by mouth daily at bedtime 90 tablet 3    ergocalciferol (VITAMIN D2) 50,000 units Take 1 capsule (50,000 Units total) by mouth once a week For 6 weeks 6 capsule 0    fluticasone (FLONASE) 50 mcg/act nasal spray 1 spray into each nostril if needed      ketoconazole (NIZORAL) 2 %  shampoo 2 (two) times a week      ketotifen (ZADITOR) 0.025 % ophthalmic solution Administer 1 drop to both eyes 2 (two) times a day as needed      Magnesium 200 MG TABS Take 200 mg by mouth in the morning      Multiple Vitamin (multivitamin) tablet Take 1 tablet by mouth daily      norethindrone-ethinyl estradiol (Junel FE 1/20) 1-20 MG-MCG per tablet Take 1 tablet by mouth daily 84 tablet 3    Riboflavin (B-2 PO) Take 1 tablet by mouth in the morning      rizatriptan (MAXALT) 5 mg tablet Take 1 tablet (5 mg total) by mouth as needed for migraine Take at the onset of migraine; if symptoms continue or return, may take another dose at least 2 hours after first dose. Take no more than 2 doses in a day. 18 tablet 2    ferrous sulfate 324 (65 Fe) mg Take 1 tablet (324 mg total) by mouth daily before breakfast (Patient not taking: Reported on 12/18/2024) 30 tablet 3     No current facility-administered medications for this visit.      Allergies:     Allergies   Allergen Reactions    Skin Protectants, Misc. [Lanolin] Hives     Has local allergic reaction to skin GLUE     Objective:     Physical Exam:  Vitals:  /70 (BP Location: Left arm, Patient Position: Sitting, Cuff Size: Adult)   Pulse 96   Temp 97.9 °F (36.6 °C) (Temporal)   Wt 46.1 kg (101 lb 9.6 oz)   SpO2 100%   BMI 15.91 kg/m²   BP Readings from Last 3 Encounters:   02/25/25 112/70   12/18/24 112/68   09/12/24 107/74     Pulse Readings from Last 3 Encounters:   02/25/25 96   12/18/24 93   09/12/24 81     On neurological examination the patient was awake, alert, attentive, oriented to person, place, and time. Recent and remote memory intact to conversation with no evidence of language dysfunction. Satisfactory fund of knowledge. Normal attention span and concentration.  Mood, affect and judgement are appropriate. Speech is fluent without dysarthria or aphasia. Face appears symmetric, with no obvious weakness noted.  Audition is intact to casual  conversation.  Eye movements are intact.  Able to move bilateral upper extremities antigravity without difficulty.      Pertinent lab results:   Lab Results   Component Value Date    SODIUM 136 2024    K 4.0 2024     2024    CO2 27 2024    AGAP 7 2024    BUN 11 2024    CREATININE 0.68 2024    GLUC 79 2024    GLUF 81 2024    CALCIUM 9.3 2024    AST 14 2024    ALT 8 2024    ALKPHOS 53 2024    TP 7.5 2024    TBILI 0.46 2024    EGFR 126 2024     Lab Results   Component Value Date    WBC 6.02 2025    HGB 12.7 2025    HCT 39.4 2025    MCV 91 2025     2025     Lab Results   Component Value Date    BWU2TXGUSOBS 1.470 2024     Lab Results   Component Value Date    IRON 119 2025    TIBC 417.2 2025    FERRITIN 27 2025 Vitamin D  46.5    Lab Results   Component Value Date    IRON 119 2025    TIBC 417.2 2025    FERRITIN 27 2025     Pertinent Imagin/10/24 MRI Brain: No abnormality evident.   21 MRI Brain wo contrast: Normal brain MRI.      Review of Systems:     Constitutional:  Negative for appetite change, fatigue and fever.   HENT: Negative.  Negative for hearing loss, tinnitus, trouble swallowing and voice change.    Eyes:  Positive for photophobia (w/migraines). Negative for pain and visual disturbance.   Respiratory: Negative.  Negative for shortness of breath.    Cardiovascular: Negative.  Negative for palpitations.   Gastrointestinal:  Positive for nausea (w/migraines). Negative for vomiting.   Endocrine: Negative.  Negative for cold intolerance.   Genitourinary: Negative.  Negative for dysuria, frequency and urgency.   Musculoskeletal:  Negative for back pain, gait problem, myalgias, neck pain and neck stiffness.   Skin: Negative.  Negative for rash.   Allergic/Immunologic: Negative.    Neurological:  Positive for  headaches (1-2 per month). Negative for dizziness, tremors, seizures, syncope, facial asymmetry, speech difficulty, weakness, light-headedness and numbness.   Hematological: Negative.  Does not bruise/bleed easily.   Psychiatric/Behavioral: Negative.  Negative for confusion, hallucinations and sleep disturbance.      Reviewed ROS as entered by medical assistant.     I have spent a total time of 30 minutes on 02/25/25 in caring for this patient including Diagnostic results, Prognosis, Risks and benefits of tx options, Instructions for management, Patient and family education, Importance of tx compliance, Risk factor reductions, Impressions, Counseling / Coordination of care, Documenting in the medical record, Reviewing / ordering tests, medicine, procedures  , and Obtaining or reviewing history  .       Author:  ALEJA Maxwell 2/25/2025 11:35 AM

## 2025-02-24 NOTE — PATIENT INSTRUCTIONS
Patient Instructions:    Additional Testing  -No additional testing required at this time.     Headache/migraine treatment:     Prevention  -To take every day to help prevent headaches - not to take at the time of headache:  -Continue Qulitpa 60mg daily  -May continue over the counter preventive supplements for headaches/migraines (if you try, try for 3 months straight):  -Magnesium 400mg daily (If any diarrhea or upset stomach, decrease dose as tolerated)  -Riboflavin (Vitamin B2) 400mg daily (may make your urine bright/neon yellow)  - All supplements can be purchased online    Abortive  -For immediate treatment of a headache/migraine  -For your more moderate to severe migraines take this medication as early as possible:  -Continue Maxalt but decrease to 5mg at the onset of a headache to see if you have fewer side effects.  May repeat again in 2 hours if not completely headache free.   -It is ok to take ibuprofen, acetaminophen or naproxen (Advil, Tylenol,  Aleve, Excedrin) if they help your headaches you should limit these to No more than 3 times a week to avoid medication overuse/rebound headaches.     Lifestyle Recommendations:    -Remain well-hydrated drinking at least 48 to 64 ounces of noncaffeinated beverages per day in addition to anything caffeinated.    -Getting adequate rest is also very important for migraine prevention (aim for 7-8 hours per night).     -Regular exercise is also beneficial for headache prevention.  I would encourage at the least 5 days of physical exercise weekly for at least 30 minutes.   -I would like for them to keep track of their migraines using an application on their phone or calendar as they see fit. Phone applications: Migraine Enrique or Migraine Diary.    Education and Follow-up:    -Please call or send a [employer] message with any questions or concerns. Please present to the emergency room with any concerning symptoms such as: worst headache of your life, sudden painless loss  of vision or double vision, difficulty speaking or swallowing, vertigo/room spinning that does not quickly resolve, or weakness/numbness/loss of coordination affecting 1 side of the face or body.  -Follow up in 4 months or sooner if needed.

## 2025-02-25 ENCOUNTER — OFFICE VISIT (OUTPATIENT)
Dept: NEUROLOGY | Facility: CLINIC | Age: 20
End: 2025-02-25
Payer: COMMERCIAL

## 2025-02-25 VITALS
TEMPERATURE: 97.9 F | OXYGEN SATURATION: 100 % | WEIGHT: 101.6 LBS | SYSTOLIC BLOOD PRESSURE: 112 MMHG | HEART RATE: 96 BPM | DIASTOLIC BLOOD PRESSURE: 70 MMHG | BODY MASS INDEX: 15.91 KG/M2

## 2025-02-25 DIAGNOSIS — G43.009 MIGRAINE WITHOUT AURA AND WITHOUT STATUS MIGRAINOSUS, NOT INTRACTABLE: Primary | ICD-10-CM

## 2025-02-25 PROCEDURE — 99214 OFFICE O/P EST MOD 30 MIN: CPT

## 2025-02-25 RX ORDER — RIZATRIPTAN BENZOATE 5 MG/1
5 TABLET ORAL AS NEEDED
Qty: 18 TABLET | Refills: 2 | Status: SHIPPED | OUTPATIENT
Start: 2025-02-25

## 2025-02-25 NOTE — PROGRESS NOTES
Name: Patricia Flanagan      : 2005      MRN: [id number]  Encounter Provider: ALEJA Maxwell  Encounter Date: 2025   Encounter department: NEUROLOGY [facility]  :  Assessment & Plan      {Ambulatory Patient Instructions (Optional):11570}    History of Present Illness {?Quick Links Encounters * My Last Note * Last Note in Specialty * Snapshot * Since Last Visit * History :48317}  HPI   Review of Systems   Constitutional:  Negative for appetite change, fatigue and fever.   HENT: Negative.  Negative for hearing loss, tinnitus, trouble swallowing and voice change.    Eyes:  Positive for photophobia (w/migraines). Negative for pain and visual disturbance.   Respiratory: Negative.  Negative for shortness of breath.    Cardiovascular: Negative.  Negative for palpitations.   Gastrointestinal:  Positive for nausea (w/migraines). Negative for vomiting.   Endocrine: Negative.  Negative for cold intolerance.   Genitourinary: Negative.  Negative for dysuria, frequency and urgency.   Musculoskeletal:  Negative for back pain, gait problem, myalgias, neck pain and neck stiffness.   Skin: Negative.  Negative for rash.   Allergic/Immunologic: Negative.    Neurological:  Positive for headaches (1-2 per month). Negative for dizziness, tremors, seizures, syncope, facial asymmetry, speech difficulty, weakness, light-headedness and numbness.   Hematological: Negative.  Does not bruise/bleed easily.   Psychiatric/Behavioral: Negative.  Negative for confusion, hallucinations and sleep disturbance.     I have personally reviewed the MA's review of systems and made changes as necessary.    {Select to insert medical history sections (Optional):84147}     Objective {?Quick Links Trend Vitals * Enter New Vitals * Results Review * Timeline (Adult) * Labs * Imaging * Cardiology * Procedures * Lung Cancer Screening * Surgical eConsent :24182}  There were no vitals taken for this visit.    Physical  Exam  Neurological Exam    {Radiology Results Review (Optional):78836}    {Administrative / Billing Section (Optional):63375}

## 2025-02-25 NOTE — ASSESSMENT & PLAN NOTE
"Since her last visit, she continues to take Qulipta for prevention which has been effective for her.  She missed a few doses secondary to not being able to  the prescription (insurance?) and noticed an increase in her migraine frequency until she started it back up.  She reported 0 migraines in January. Had 3 migraines in feb since missing some doses of the qulipta. She has not had any visual disturbances since around October.  She reports that she is living in a villa at school and is now able to cook for herself, so she believes her diet had been healthier than before which may be helping with her headache frequency as well.  Maxalt is effective for her but she experiences drowsiness and feels a little \"out of it\" when she takes this medication.  She experiences mild nausea with her migraines but doesn't feel that it is too troublesome at this juncture.    Workup:  7/10/24 MRI Brain: normal  4/26/24 Ophthalmology evaluation (under media)  Additional testing:   No additional workup required at this time  Preventative:  We discussed headache hygiene and lifestyle factors that may improve headaches  Continue Qulipta 60 mg daily.  If patient continues to notice weight loss we could stop this medication for a few months and see if that her weight stabilizes  Continue Magnesium and may add B2 at 400mg each daily  Past/ failed/contraindicated: amitriptyline, topamax  Future options: propranolol, CGRP med, botox  Acute:  Discussed not taking over-the-counter or prescription pain medications more than 3 days per week to prevent medication overuse/rebound headache  Continue Maxalt but decrease to 5mg at the earliest onset of a migraine.  May repeat again in 2 hours if not completely headache free. If this is ineffective or you still experience side effects, we discussed trying ubrelvy  Future options:  Triptan, ubrelvy, reyvow    "

## 2025-05-19 NOTE — PROGRESS NOTES
Subjective      Patricia Flanagan is a 20 y.o. female who presents for annual GYN exam.     GYN:  Menses are regular, q month, lasting 4 days. Denies intermenstrual bleeding, or spotting.   Denies vaginal discharge, labial erythema or lesions, dyspareunia.  Contraception: CRISPIN.  Patient is sexually active with  partner.      OB:  OB History    Para Term  AB Living   0 0 0 0 0 0   SAB IAB Ectopic Multiple Live Births   0 0 0 0 0         :  Denies dysuria, urinary frequency or urgency.  Denies hematuria, flank pain, incontinence.    Breast:  Denies breast mass, skin changes, dimpling, reddening, nipple retraction.  Denies breast discharge.  Patient does have a family history of breast, endometrial, colon, or ovarian ca.     Cancer-related family history includes Cancer in her paternal grandmother.     Past Medical History:   Diagnosis Date    Croup in child     Hives     resolved    Migraines     Seasonal allergies     Wears contact lenses        Past Surgical History:   Procedure Laterality Date    ID CYSTOURETHROSCOPY W/URETERAL CATHETERIZATION N/A 10/10/2022    Procedure: CYSTOSCOPY WITH INSERTION STENT URETERAL;  Surgeon: Jorge Hernandez MD;  [facility]: [facility];  Service: Urology    ID LAPAROSCOPY SURG PYELOPLASTY Left 10/10/2022    Procedure: PYELOPLASTY LAPAROSCOPIC W/ ROBOTICS;  Surgeon: Jorge Hernandez MD;  [facility];  Service: Urology    ID LAPAROSCOPY SURG PYELOPLASTY Right 2023    Procedure: PYELOPLASTY LAPAROSCOPIC W/ ROBOTICS;  Surgeon: Jorge Hernandez MD;  [facility];  Service: Urology    URETERAL STENT PLACEMENT Right 2023    Procedure: INSERTION STENT URETERAL;  Surgeon: Jorge Hernandez MD;  [facility];  Service: Urology         General:  BMI: 16  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   Breast cancer: last mammogram in Not on file.  "  Colon cancer: last colonoscopy in Not on file     Review of Systems   Constitutional:  Negative for fatigue.   Eyes:  Negative for photophobia and visual disturbance.   Respiratory:  Negative for cough and shortness of breath.    Cardiovascular:  Negative for chest pain and palpitations.   Gastrointestinal:  Negative for abdominal pain, blood in stool, constipation, diarrhea, nausea and rectal pain.   Genitourinary:  Negative for dyspareunia, dysuria, flank pain, frequency, genital sores, menstrual problem, pelvic pain, urgency, vaginal bleeding, vaginal discharge and vaginal pain.   Musculoskeletal:  Negative for arthralgias and back pain.   Skin:  Negative for rash.   Neurological:  Negative for weakness and headaches.   All other systems reviewed and are negative.         Objective      /80 (BP Location: Left arm, Patient Position: Sitting, Cuff Size: Standard)   Ht 5' 7\" (1.702 m)   Wt 47.6 kg (105 lb)   LMP 05/06/2025 (Exact Date)   BMI 16.45 kg/m²   Physical Exam  Vitals and nursing note reviewed.   Constitutional:       Appearance: Normal appearance.   HENT:      Head: Normocephalic.     Eyes:      Conjunctiva/sclera: Conjunctivae normal.       Cardiovascular:      Rate and Rhythm: Normal rate and regular rhythm.      Heart sounds: Normal heart sounds.   Pulmonary:      Effort: Pulmonary effort is normal.      Breath sounds: Normal breath sounds.   Chest:   Breasts:     Right: Normal. No inverted nipple, mass, nipple discharge, skin change or tenderness.      Left: Normal. No inverted nipple, mass, nipple discharge, skin change or tenderness.   Abdominal:      General: Abdomen is flat.      Palpations: Abdomen is soft. There is no mass.      Tenderness: There is no abdominal tenderness. There is no right CVA tenderness or left CVA tenderness.   Genitourinary:     General: Normal vulva.      Exam position: Lithotomy position.      Pubic Area: No rash or pubic lice.       Labia:         Right: No " rash or tenderness.         Left: No rash or tenderness.       Urethra: No prolapse or urethral pain.      Vagina: Normal. No vaginal discharge.      Cervix: Normal.      Uterus: Normal.       Adnexa: Right adnexa normal and left adnexa normal.        Right: No mass or tenderness.          Left: No mass or tenderness.       Musculoskeletal:         General: Normal range of motion.      Cervical back: Normal range of motion. No tenderness.      Right lower leg: No edema.      Left lower leg: No edema.   Lymphadenopathy:      Cervical: No cervical adenopathy.      Upper Body:      Right upper body: No supraclavicular or axillary adenopathy.      Left upper body: No supraclavicular or axillary adenopathy.      Lower Body: No right inguinal adenopathy. No left inguinal adenopathy.     Skin:     General: Skin is warm and dry.      Findings: No rash.     Neurological:      Mental Status: She is alert and oriented to person, place, and time.     Psychiatric:         Mood and Affect: Mood normal.         Behavior: Behavior normal.         Thought Content: Thought content normal.         Judgment: Judgment normal.                 Assessment/Plan  Problem List Items Addressed This Visit    None  Visit Diagnoses         Well woman exam    -  Primary      Menometrorrhagia        Relevant Medications    norethindrone-ethinyl estradiol (Junel FE 1/20) 1-20 MG-MCG per tablet      Cervical cancer screening        Relevant Orders    Liquid-based pap, screening            Denies GYN concerns today  She is interested in the Gardasil vaccine. Education provided. Reviewed to call insurance for approval and schedule nurse visit.  Birth control: CRISPIN  Cervical cancer screening: pap collected  Breast Cancer screening: age 40  Colon cancer Screening: age 45  STD screening: declines  Reviewed healthy lifestyle and safe sex practices  RTO for annual exam or PRN      ALEJA Vincent  OB/GYN  5/20/2025  11:12 AM          [1]   Social  History  Tobacco Use    Smoking status: Never     Passive exposure: Never    Smokeless tobacco: Never   Vaping Use    Vaping status: Never Used   Substance Use Topics    Alcohol use: Yes     Comment: socially    Drug use: Never

## 2025-05-20 ENCOUNTER — ANNUAL EXAM (OUTPATIENT)
Dept: OBGYN CLINIC | Facility: CLINIC | Age: 20
End: 2025-05-20

## 2025-05-20 VITALS
WEIGHT: 105 LBS | HEIGHT: 67 IN | DIASTOLIC BLOOD PRESSURE: 80 MMHG | BODY MASS INDEX: 16.48 KG/M2 | SYSTOLIC BLOOD PRESSURE: 110 MMHG

## 2025-05-20 DIAGNOSIS — N92.1 MENOMETRORRHAGIA: ICD-10-CM

## 2025-05-20 DIAGNOSIS — Z12.4 CERVICAL CANCER SCREENING: ICD-10-CM

## 2025-05-20 DIAGNOSIS — Z01.419 WELL WOMAN EXAM: Primary | ICD-10-CM

## 2025-05-20 PROCEDURE — G0145 SCR C/V CYTO,THINLAYER,RESCR: HCPCS

## 2025-05-20 RX ORDER — NORETHINDRONE ACETATE AND ETHINYL ESTRADIOL 1MG-20(21)
1 KIT ORAL DAILY
Qty: 84 TABLET | Refills: 3 | Status: SHIPPED | OUTPATIENT
Start: 2025-05-20

## 2025-05-23 ENCOUNTER — RESULTS FOLLOW-UP (OUTPATIENT)
Dept: OBGYN CLINIC | Facility: CLINIC | Age: 20
End: 2025-05-23

## 2025-05-23 LAB
LAB AP GYN PRIMARY INTERPRETATION: NORMAL
Lab: NORMAL

## 2025-06-02 ENCOUNTER — CLINICAL SUPPORT (OUTPATIENT)
Dept: OBGYN CLINIC | Facility: CLINIC | Age: 20
End: 2025-06-02
Payer: COMMERCIAL

## 2025-06-02 VITALS — BODY MASS INDEX: 16.57 KG/M2 | WEIGHT: 105.8 LBS | DIASTOLIC BLOOD PRESSURE: 68 MMHG | SYSTOLIC BLOOD PRESSURE: 104 MMHG

## 2025-06-02 DIAGNOSIS — Z23 NEED FOR HPV VACCINATION: Primary | ICD-10-CM

## 2025-06-02 PROCEDURE — 90471 IMMUNIZATION ADMIN: CPT

## 2025-06-02 PROCEDURE — 90651 9VHPV VACCINE 2/3 DOSE IM: CPT

## 2025-06-02 RX ORDER — SULFACETAMIDE SODIUM 100 MG/ML
LOTION TOPICAL
COMMUNITY
Start: 2025-05-20

## 2025-07-21 NOTE — PROGRESS NOTES
Name: Patricia Flanagan      : 2005      MRN: [id number]  Encounter Provider: ALEJA Maxwell  Encounter Date: 2025   Encounter department: [facility]  :  Assessment & Plan  Migraine with aura and without status migrainosus, not intractable  She has had a decrease in her headache frequency now about 1-2 per month (7 since Feb).  Intensity about the same and denies any new symptoms associated with the headaches.  She attributes the decrease in frequency to Qulipta and Magnesium but also reports that she has been keeping her sleep schedule regular which has been helpful.  With the abortive medication, headaches are no longer lasting an entire day and she generally only needs one dose of the Maxalt. She continues to experience occasional visual disturbances which do sound suspicious for auras.  She will see wavy lines and sometimes kaleidoscope vision which only lasts minutes at a time.  Sometimes she will develop a headache afterwards but not always.  This has happened only a couple of times since her last visit.  She has noted a decrease in auras with the Qulipta as well.  She had a normal eye exam in December and she denies morning headache they tend to progress throughout the day, and  denies tinnitus including pulsatile tinnitus.  She would like to keep her current headache regimen the same for now since it has been effective.  She can try taking the Maxalt at he onset of the aura rather than once the headache develops.  I do recommend a dilated eye exam at her next visit to rule out papilledema although I have a very low suspicion for this.  She is on an estrogen containing birth control and we did discuss the risk of migraine with aura and I would like her to have a discussion with her GYN with regard to switching to a progestin-only birth control option if possible.   Workup:  7/10/24 MRI Brain: normal  24 Ophthalmology evaluation (under  "media)  Additional testing:   No additional workup required at this time  Preventative:  We discussed headache hygiene and lifestyle factors that may improve headaches  Continue Qulipta 60 mg daily  Continue Magnesium and may add B2 at 400mg each daily  Past/ failed/contraindicated: amitriptyline, topamax  Future options: propranolol, CGRP med, botox  Acute:  Discussed not taking over-the-counter or prescription pain medications more than 3 days per week to prevent medication overuse/rebound headache  Continue Maxalt at the earliest onset of a migraine.  May repeat again in 2 hours if not completely headache free  Future options:  Triptan, ubrelvy, reyvow   Orders:    rizatriptan (MAXALT) 5 mg tablet; Take 1 tablet (5 mg total) by mouth as needed for migraine Take at the onset of migraine; if symptoms continue or return, may take another dose at least 2 hours after first dose. Take no more than 2 doses in a day.    Atogepant (Qulipta) 60 MG TABS; Take 60 mg by mouth daily at bedtime          History of Present Illness     We had the pleasure of evaluating Patricia in neurological follow up today. She is a 20 y.o. year-old female who presents today for evaluation of headaches.      History obtained from patient as well as available medical record review.     OV with me 4/4/24:  For review, \"This patient has a HX of Migraines and they are treated with Nurtec prescribed by the PCP. Patient stated that the medication is working well for her but she is still experiencing about 5 migraine headaches per month. After roque, she mentioned to her mom that she was having some visual disturbances where her vision felt like it was shifting from left to right.  Denies blurry vision. Feel like the room is spinning when this happens but she has always been sitting down.  This has occurred about 10x since December and the episodes last less than 5 minutes.  She sometimes has these subjective visual disturbances without the head " "pain.  Mom has not been able to witness them so we are unsure if the eye darting is in fact nystagmus.  She is on the computer a lot and has a lot of screen time in general, so could be related to eye strain fatigue.  She saw the optometrist and they told her that her vision was good and they recommended eye drops for dry eye.  She is unsure if this has been helping with the symptoms.\"      OV with me 7/8/24: Since her last office visit, she is doing about the same.  Reports experiencing at least 5 migraines a month and at least one headache a week, which is the same frequency as her last office visit in April. She is still having the issues with her vision. Nurtec has been taking longer to work for her migraines, which have been lasting approximately 7 hours.  It has been approximately one month since the patient experienced the visual shifting sensation.  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 She was started on Qulipta for prevention and Maxalt for rescue.   OV with me 9/12/24:Since her last office visit, she reports a decrease in the frequency and intensity of her migraine headaches after starting the Qulipta. She has only experienced 4 migraines since starting Qulipta (~ 2/month). Less head pressure in between headaches as well.  Maxalt is effective at getting rid of the headaches after 1 dose.  Usually takes about 2 hours.  She reports not being as hungry and possibly losing a little bit of weight and was unsure if it was related to the Qulipta.  She denies any nausea, vomiting, or diarrhea with this medication.  She is back in school and lives on campus.   OV with me 2/25/25: Since her last visit, she continues to take Qulipta for prevention which has been effective for her.  She missed a few doses secondary to not being able to  the prescription (insurance?) and noticed an increase in her migraine frequency until she started it back up.  She " "reported 0 migraines in January. Had 3 migraines in feb since missing some doses of the qulipta. She has not had any visual disturbances since around October.  She reports that she is living in a villa at school and is now able to cook for herself, so she believes her diet had been healthier than before which may be helping with her headache frequency as well.  Maxalt is effective for her but she experiences drowsiness and feels a little \"out of it\" when she takes this medication.  She experiences mild nausea with her migraines but doesn't feel that it is too troublesome at this juncture.      Interval history as of 7/22/25:  She has had a decrease in her headache frequency now about 1-2 per month (7 since Feb).  Intensity about the same and denies any new symptoms associated with the headaches.  She attributes the decrease in frequency to Qulipta and Magnesium but also reports that she has been keeping her sleep schedule regular which has been helpful.  With the abortive medication, headaches are no longer lasting an entire day and she generally only needs one dose of the Maxalt. She continues to experience occasional visual disturbances which do sound suspicious for auras.  She will see wavy lines and sometimes kaleidoscope vision which only lasts minutes at a time.  Sometimes she will develop a headache afterwards but not always.  This has happened only a couple of times since her last visit.  She has noted a decrease in auras with the Qulipta as well.  She had a normal eye exam in December and she denies morning headache they tend to progress throughout the day, and  denies tinnitus including pulsatile tinnitus.  She would like to keep her current headache regimen the same for now since it has been effective.  She can try taking the Maxalt at he onset of the aura rather than once the headache develops.  I do recommend a dilated eye exam at her next visit to rule out papilledema although I have a very low " suspicion for this.  She is on an estrogen containing birth control and we did discuss the risk of migraine with aura and I would like her to have a discussion with her GYN with regard to switching to a progestin-only birth control option if possible.      Headaches started at what age? 14 years old.    How often do the headaches occur? 5 per month. Last visit 2/month. 2/25/25: 2-3 per month. 7/22/25: 1-2 per month  What time of the day do the headaches start?  Can have them upon waking or they progress throughout the day.   How long do the headaches last? Headaches could last all day.     Are you ever headache free? No    Aura? With. She will see moving lines in her vision sometimes kaleidoscope.  This can last about 5 minutes.  Sometimes followed by the headache and sometimes not.      Where is your headache located and pain quality? Stabbing pain frontal or on the top of her head can be left or right sided.   What is the intensity of pain? Worst 9/10, Average: 5/10  Associated symptoms:   [x] Nausea       [] Vomiting        [] Diarrhea  [] Stiff or sore neck   [] Problems with concentration  [x] Photophobia     [x]Phonophobia      [] Osmophobia  [] Blurred vision   [x] Prefer quiet, dark room  [x] Light-headed or dizzy (occasionally with and without the headaches)  [] Tinnitus   [] Hands or feet tingle or feel numb/paresthesias    [] Ptosis      [] Facial droop  [] Lacrimation  [] Nasal congestion/rhinorrhea       Things that make the headache worse? Standing up makes the pain worse.      Headache triggers:  none in particular. Sometimes around menstrual cycle.      Have you seen someone else for headaches or pain? Yes, PCP treating  Have you had trigger point injection performed and how often? No  Have you had Botox injection performed and how often? No   Have you had epidural injections or transforaminal injections performed? No  Are you current pregnant or planning on getting pregnant? No. On birth  control  Have you ever had any Brain imaging? Yes brain MRI     Last eye exam: Optometry in December     What medications do you take or have you taken for your headaches?:     ABORTIVE:    OTC medications: none  Prescription: Maxalt (effective)  Past/failed/contraindicated: nurtec (ineffective)     PREVENTIVE:   OTC medications: Magnesium   Prescription: Qulipta (effective)  Past/failed/contraindicated: Topamax (side effects), amitriptyline (ineffective)     Lifestyle history/modifications:     Sleep   Averages: 7-8 hours   Problems falling asleep?: Yes  Problems staying asleep?:  No  Physical activity: Nothing routine. 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    Caffeine: 0 per day. Occasional soda  Mood: feel like she is doing well. Currently in college.      Pertinent family history:  Family history of headaches:  migraine headaches in mother and grandmother  Any family history of aneurysms - No     Pertinent social history:  Work: part time on campus  Education: full time college student currently  Lives with a roommate in the dorms  Illicit Drugs: denies  Alcohol/tobacco: Denies alcohol use, Denies tobacco use    Review of Systems   Constitutional:  Negative for appetite change, fatigue and fever.   HENT: Negative.  Negative for hearing loss, tinnitus, trouble swallowing and voice change.    Eyes: Negative.  Negative for photophobia, pain and visual disturbance.   Respiratory: Negative.  Negative for shortness of breath.    Cardiovascular: Negative.  Negative for palpitations.   Gastrointestinal: Negative.  Negative for nausea and vomiting.   Endocrine: Negative.  Negative for cold intolerance.   Genitourinary: Negative.  Negative for dysuria, frequency and urgency.   Musculoskeletal:  Negative for back pain, gait problem, myalgias, neck pain and neck stiffness.   Skin: Negative.  Negative for rash.   Allergic/Immunologic: Negative.     Neurological:  Negative for dizziness, tremors, seizures, syncope, facial asymmetry, speech difficulty, weakness, light-headedness, numbness and headaches.        Patient states having improvement with one to two migraines per month   Hematological: Negative.  Does not bruise/bleed easily.   Psychiatric/Behavioral: Negative.  Negative for confusion, hallucinations and sleep disturbance.      I have personally reviewed the MA's review of systems and made changes as necessary.    Medications Ordered Prior to Encounter[1]   Social History[2]     Objective   BP 90/70 (BP Location: Left arm, Patient Position: Sitting, Cuff Size: Large)   Pulse 88     On neurological examination the patient was awake, alert, attentive, oriented to person, place, and time. Recent and remote memory intact to conversation with no evidence of language dysfunction. Satisfactory fund of knowledge. Normal attention span and concentration.  Mood, affect and judgement are appropriate. Speech is fluent without dysarthria or aphasia. Face appears symmetric, with no obvious weakness noted. Audition is intact to casual conversation.  Eye movements are intact.  Able to move bilateral upper extremities antigravity without difficulty.      Labs:  Lab Results   Component Value Date    WBC 6.02 02/21/2025    HGB 12.7 02/21/2025    HCT 39.4 02/21/2025    MCV 91 02/21/2025     02/21/2025     Lab Results   Component Value Date    IRON 119 02/21/2025    TIBC 417.2 02/21/2025    FERRITIN 27 02/21/2025     Radiology Results Review:  7/10/24 MRI Brain: Unremarkable.   8/12/21 MRI Brain wo contrast: Normal Brain MRI.     Administrative Statements   I have spent a total time of 30 minutes in caring for this patient on the day of the visit/encounter including Diagnostic results, Prognosis, Risks and benefits of tx options, Instructions for management, Patient and family education, Importance of tx compliance, Risk factor reductions, Impressions, Counseling  / Coordination of care, Documenting in the medical record, Reviewing/placing orders in the medical record (including tests, medications, and/or procedures), and Obtaining or reviewing history  .         [1]   Current Outpatient Medications on File Prior to Visit   Medication Sig Dispense Refill    adapalene (DIFFERIN) 0.1 % cream Apply 1 Application topically every other day      ergocalciferol (VITAMIN D2) 50,000 units Take 1 capsule (50,000 Units total) by mouth once a week For 6 weeks 6 capsule 0    fluticasone (FLONASE) 50 mcg/act nasal spray 1 spray into each nostril if needed      ketoconazole (NIZORAL) 2 % shampoo 2 (two) times a week      ketotifen (ZADITOR) 0.025 % ophthalmic solution Administer 1 drop to both eyes as needed in the morning and 1 drop as needed in the evening.      Klaron 10 % LOTN Apply topically      Magnesium 200 MG TABS Take 200 mg by mouth in the morning      Multiple Vitamin (multivitamin) tablet Take 1 tablet by mouth in the morning.      norethindrone-ethinyl estradiol (Junel FE 1/20) 1-20 MG-MCG per tablet Take 1 tablet by mouth daily 84 tablet 3    [DISCONTINUED] Atogepant (Qulipta) 60 MG TABS Take 60 mg by mouth daily at bedtime 90 tablet 3    [DISCONTINUED] Riboflavin (B-2 PO) Take 1 tablet by mouth in the morning      [DISCONTINUED] rizatriptan (MAXALT) 5 mg tablet Take 1 tablet (5 mg total) by mouth as needed for migraine Take at the onset of migraine; if symptoms continue or return, may take another dose at least 2 hours after first dose. Take no more than 2 doses in a day. 18 tablet 2    [DISCONTINUED] ferrous sulfate 324 (65 Fe) mg Take 1 tablet (324 mg total) by mouth daily before breakfast (Patient not taking: Reported on 12/18/2024) 30 tablet 3     No current facility-administered medications on file prior to visit.   [2]   Social History  Tobacco Use    Smoking status: Never     Passive exposure: Never    Smokeless tobacco: Never   Vaping Use    Vaping status: Never Used    Substance and Sexual Activity    Alcohol use: Yes     Comment: socially    Drug use: Never    Sexual activity: Yes     Partners: Male     Birth control/protection: OCP, Condom Male

## 2025-07-21 NOTE — PATIENT INSTRUCTIONS
Migraine patients with aura, like other individuals, should avoid cigarette smoking and manage stroke risk factors such as hypertension, hyperlipidemia, and diabetes mellitus.     Women with migraine over age 35 years, and those with aura at any age should consider birth control alternative to combined oral contraceptives, (prefer progesterone only) particularly if they have other vascular risk factors (hypertension, hyperlipidemia, smoking), or a personal or family history of thrombosis (World Health Organization consensus guidelines). However if is reasonable to recommend lowest possible dose of estrogen to be used if a combined hormonal contraceptive has to be used in migraine patient without aura.     I also informed patient that if an aura last for more than its typical time.  Or has any new neurological symptoms (numbness/ tingling/ weakness) patient is to go to the ER for evaluation.    Patient Instructions:    Additional Testing  -No additional testing required at this time.     Headache/migraine treatment:     Prevention  -To take every day to help prevent headaches - not to take at the time of headache:  -Continue Qulitpa 60mg daily  -May continue over the counter preventive supplements for headaches/migraines (if you try, try for 3 months straight):  -Magnesium 400mg daily (If any diarrhea or upset stomach, decrease dose as tolerated)  - All supplements can be purchased online    Abortive  -For immediate treatment of a headache/migraine  -For your more moderate to severe migraines take this medication as early as possible:  -Continue Maxalt but decrease to 5mg at the onset of a headache to see if you have fewer side effects.  May repeat again in 2 hours if not completely headache free.   -It is ok to take ibuprofen, acetaminophen or naproxen (Advil, Tylenol,  Aleve, Excedrin) if they help your headaches you should limit these to No more than 3 times a week to avoid medication overuse/rebound headaches.      Lifestyle Recommendations:    -Remain well-hydrated drinking at least 48 to 64 ounces of noncaffeinated beverages per day in addition to anything caffeinated.    -Getting adequate rest is also very important for migraine prevention (aim for 7-8 hours per night).     -Regular exercise is also beneficial for headache prevention.  I would encourage at the least 5 days of physical exercise weekly for at least 30 minutes.   -I would like for them to keep track of their migraines using an application on their phone or calendar as they see fit. Phone applications: Migraine Enrique or Migraine Diary.    Education and Follow-up:    -Please call or send a [employer] message with any questions or concerns. Please present to the emergency room with any concerning symptoms such as: worst headache of your life, sudden painless loss of vision or double vision, difficulty speaking or swallowing, vertigo/room spinning that does not quickly resolve, or weakness/numbness/loss of coordination affecting 1 side of the face or body.  -Follow up in 6 months or sooner if needed.

## 2025-07-22 ENCOUNTER — OFFICE VISIT (OUTPATIENT)
Dept: NEUROLOGY | Facility: CLINIC | Age: 20
End: 2025-07-22
Payer: COMMERCIAL

## 2025-07-22 VITALS — HEART RATE: 88 BPM | SYSTOLIC BLOOD PRESSURE: 90 MMHG | DIASTOLIC BLOOD PRESSURE: 70 MMHG

## 2025-07-22 DIAGNOSIS — G43.109 MIGRAINE WITH AURA AND WITHOUT STATUS MIGRAINOSUS, NOT INTRACTABLE: Primary | ICD-10-CM

## 2025-07-22 PROCEDURE — 99214 OFFICE O/P EST MOD 30 MIN: CPT

## 2025-07-22 RX ORDER — RIZATRIPTAN BENZOATE 5 MG/1
5 TABLET ORAL AS NEEDED
Qty: 18 TABLET | Refills: 5 | Status: SHIPPED | OUTPATIENT
Start: 2025-07-22

## 2025-07-22 RX ORDER — ATOGEPANT 60 MG/1
60 TABLET ORAL
Qty: 90 TABLET | Refills: 3 | Status: SHIPPED | OUTPATIENT
Start: 2025-07-22

## 2025-07-22 NOTE — PROGRESS NOTES
Name: Patricia Flanagan      : 2005      MRN: [id number]  Encounter Provider: ALEJA Maxwell  Encounter Date: 2025   Encounter department: [facility]  :  Assessment & Plan      {Ambulatory Patient Instructions (Optional):81233}    History of Present Illness {?Quick Links Encounters * My Last Note * Last Note in Specialty * Snapshot * Since Last Visit * History :97951}  Migraine  Pertinent negatives include no back pain, dizziness, eye pain, fever, hearing loss, nausea, neck pain, numbness, photophobia, seizures, tinnitus, vomiting or weakness.      Review of Systems   Constitutional:  Negative for appetite change, fatigue and fever.   HENT: Negative.  Negative for hearing loss, tinnitus, trouble swallowing and voice change.    Eyes: Negative.  Negative for photophobia, pain and visual disturbance.   Respiratory: Negative.  Negative for shortness of breath.    Cardiovascular: Negative.  Negative for palpitations.   Gastrointestinal: Negative.  Negative for nausea and vomiting.   Endocrine: Negative.  Negative for cold intolerance.   Genitourinary: Negative.  Negative for dysuria, frequency and urgency.   Musculoskeletal:  Negative for back pain, gait problem, myalgias, neck pain and neck stiffness.   Skin: Negative.  Negative for rash.   Allergic/Immunologic: Negative.    Neurological:  Negative for dizziness, tremors, seizures, syncope, facial asymmetry, speech difficulty, weakness, light-headedness, numbness and headaches.        Patient states having improvement with one to two migraines per month   Hematological: Negative.  Does not bruise/bleed easily.   Psychiatric/Behavioral: Negative.  Negative for confusion, hallucinations and sleep disturbance.     I have personally reviewed the MA's review of systems and made changes as necessary.    {Select to insert medical history sections (Optional):18791}     Objective {?Quick Links Trend Vitals * Enter New  Vitals * Results Review * Timeline (Adult) * Labs * Imaging * Cardiology * Procedures * Lung Cancer Screening * Surgical eConsent :69341}  There were no vitals taken for this visit.    Physical Exam  Neurological Exam    {Radiology Results Review (Optional):26732}    {Administrative / Billing Section (Optional):96398}

## 2025-07-22 NOTE — ASSESSMENT & PLAN NOTE
She has had a decrease in her headache frequency now about 1-2 per month (7 since Feb).  Intensity about the same and denies any new symptoms associated with the headaches.  She attributes the decrease in frequency to Qulipta and Magnesium but also reports that she has been keeping her sleep schedule regular which has been helpful.  With the abortive medication, headaches are no longer lasting an entire day and she generally only needs one dose of the Maxalt. She continues to experience occasional visual disturbances which do sound suspicious for auras.  She will see wavy lines and sometimes kaleidoscope vision which only lasts minutes at a time.  Sometimes she will develop a headache afterwards but not always.  This has happened only a couple of times since her last visit.  She has noted a decrease in auras with the Qulipta as well.  She had a normal eye exam in December and she denies morning headache they tend to progress throughout the day, and  denies tinnitus including pulsatile tinnitus.  She would like to keep her current headache regimen the same for now since it has been effective.  She can try taking the Maxalt at he onset of the aura rather than once the headache develops.  I do recommend a dilated eye exam at her next visit to rule out papilledema although I have a very low suspicion for this.  She is on an estrogen containing birth control and we did discuss the risk of migraine with aura and I would like her to have a discussion with her GYN with regard to switching to a progestin-only birth control option if possible.   Workup:  7/10/24 MRI Brain: normal  4/26/24 Ophthalmology evaluation (under media)  Additional testing:   No additional workup required at this time  Preventative:  We discussed headache hygiene and lifestyle factors that may improve headaches  Continue Qulipta 60 mg daily  Continue Magnesium and may add B2 at 400mg each daily  Past/ failed/contraindicated: amitriptyline,  topamax  Future options: propranolol, CGRP med, botox  Acute:  Discussed not taking over-the-counter or prescription pain medications more than 3 days per week to prevent medication overuse/rebound headache  Continue Maxalt at the earliest onset of a migraine.  May repeat again in 2 hours if not completely headache free  Future options:  Triptan, ubrelvy, reyvow   Orders:    rizatriptan (MAXALT) 5 mg tablet; Take 1 tablet (5 mg total) by mouth as needed for migraine Take at the onset of migraine; if symptoms continue or return, may take another dose at least 2 hours after first dose. Take no more than 2 doses in a day.    Atogepant (Qulipta) 60 MG TABS; Take 60 mg by mouth daily at bedtime

## 2025-07-22 NOTE — ASSESSMENT & PLAN NOTE
Orders:    rizatriptan (MAXALT) 5 mg tablet; Take 1 tablet (5 mg total) by mouth as needed for migraine Take at the onset of migraine; if symptoms continue or return, may take another dose at least 2 hours after first dose. Take no more than 2 doses in a day.    Atogepant (Qulipta) 60 MG TABS; Take 60 mg by mouth daily at bedtime

## 2025-08-01 ENCOUNTER — CLINICAL SUPPORT (OUTPATIENT)
Dept: OBGYN CLINIC | Facility: CLINIC | Age: 20
End: 2025-08-01
Payer: COMMERCIAL

## 2025-08-01 VITALS — WEIGHT: 106 LBS | DIASTOLIC BLOOD PRESSURE: 62 MMHG | SYSTOLIC BLOOD PRESSURE: 100 MMHG | BODY MASS INDEX: 16.6 KG/M2

## 2025-08-01 DIAGNOSIS — Z23 NEED FOR HPV VACCINATION: Primary | ICD-10-CM

## 2025-08-01 PROCEDURE — 90651 9VHPV VACCINE 2/3 DOSE IM: CPT

## 2025-08-01 PROCEDURE — 90471 IMMUNIZATION ADMIN: CPT

## 2025-08-20 ENCOUNTER — OFFICE VISIT (OUTPATIENT)
Dept: URGENT CARE | Facility: CLINIC | Age: 20
End: 2025-08-20
Payer: COMMERCIAL

## 2025-08-20 VITALS
SYSTOLIC BLOOD PRESSURE: 107 MMHG | RESPIRATION RATE: 14 BRPM | WEIGHT: 108 LBS | DIASTOLIC BLOOD PRESSURE: 74 MMHG | TEMPERATURE: 98.5 F | BODY MASS INDEX: 16.95 KG/M2 | OXYGEN SATURATION: 99 % | HEIGHT: 67 IN | HEART RATE: 84 BPM

## 2025-08-20 DIAGNOSIS — L85.3 DRY SKIN: Primary | ICD-10-CM

## 2025-08-20 PROCEDURE — G0382 LEV 3 HOSP TYPE B ED VISIT: HCPCS

## 2025-08-20 PROCEDURE — S9083 URGENT CARE CENTER GLOBAL: HCPCS

## (undated) DEVICE — DRAIN SPONGES,6 PLY: Brand: EXCILON

## (undated) DEVICE — CHLORHEXIDINE 4PCT 4 OZ

## (undated) DEVICE — JP CHANNEL DRAIN 19FR, FULL FLUTES: Brand: JACKSON-PRATT

## (undated) DEVICE — BLADELESS OBTURATOR: Brand: WECK VISTA

## (undated) DEVICE — INVIEW CLEAR LEGGINGS: Brand: CONVERTORS

## (undated) DEVICE — MONOPOLAR CURVED SCISSORS: Brand: ENDOWRIST

## (undated) DEVICE — SUT MONOCRYL 4-0 PS-2 27 IN Y426H

## (undated) DEVICE — TIP COVER ACCESSORY

## (undated) DEVICE — LUBRICANT INST ELECTROLUBE ANTISTK WO PAD

## (undated) DEVICE — GLOVE SRG BIOGEL ECLIPSE 7.5

## (undated) DEVICE — IRRIG ENDO FLO TUBING

## (undated) DEVICE — SYRINGE 10ML LL

## (undated) DEVICE — JACKSON-PRATT 100CC BULB RESERVOIR: Brand: CARDINAL HEALTH

## (undated) DEVICE — GUIDEWIRE ANGLED TIP 0.035 IN SOLO PLUS

## (undated) DEVICE — STRL COTTON TIP APPLCTR 6IN PK: Brand: CARDINAL HEALTH

## (undated) DEVICE — ARM DRAPE

## (undated) DEVICE — FENESTRATED BIPOLAR FORCEPS: Brand: ENDOWRIST

## (undated) DEVICE — HEAVY DUTY TABLE COVER: Brand: CONVERTORS

## (undated) DEVICE — ELECTRO LUBE IS A SINGLE PATIENT USE DEVICE THAT IS INTENDED TO BE USED ON ELECTROSURGICAL ELECTRODES TO REDUCE STICKING.: Brand: KEY SURGICAL ELECTRO LUBE

## (undated) DEVICE — PACK TUR

## (undated) DEVICE — CHLORAPREP HI-LITE 26ML ORANGE

## (undated) DEVICE — LARGE NEEDLE DRIVER: Brand: ENDOWRIST

## (undated) DEVICE — SUT PDS II 0 CT-1 27 IN Z340H

## (undated) DEVICE — VISUALIZATION SYSTEM: Brand: CLEARIFY

## (undated) DEVICE — PENCIL ELECTROSURG E-Z CLEAN -0035H

## (undated) DEVICE — CATH URETERAL 5FR X 70 CM FLEX TIP POLYUR BARD

## (undated) DEVICE — CATH SECURE FOLEY

## (undated) DEVICE — PREMIUM DRY TRAY LF: Brand: MEDLINE INDUSTRIES, INC.

## (undated) DEVICE — GUIDEWIRE STRGHT TIP 0.035 IN  SOLO PLUS

## (undated) DEVICE — SPECIMEN CONTAINER STERILE PEEL PACK

## (undated) DEVICE — INSUFLATION TUBING INSUFLOW (LEXION)

## (undated) DEVICE — GLOVE INDICATOR PI UNDERGLOVE SZ 8 BLUE

## (undated) DEVICE — DRAPE SHEET X-LG

## (undated) DEVICE — ADHESIVE SKIN HIGH VISCOSITY EXOFIN 1ML

## (undated) DEVICE — SUT VICRYL 4-0 SH 27 IN J415H

## (undated) DEVICE — SUT VICRYL 4-0 RB-1 27 IN J214H

## (undated) DEVICE — ENDOPATH PNEUMONEEDLE INSUFFLATION NEEDLES WITH LUER LOCK CONNECTORS 120MM: Brand: ENDOPATH

## (undated) DEVICE — NEEDLE 25G X 1 1/2

## (undated) DEVICE — POTTS SCISSORS: Brand: ENDOWRIST

## (undated) DEVICE — TROCAR: Brand: KII FIOS FIRST ENTRY

## (undated) DEVICE — INTENDED FOR TISSUE SEPARATION, AND OTHER PROCEDURES THAT REQUIRE A SHARP SURGICAL BLADE TO PUNCTURE OR CUT.: Brand: BARD-PARKER SAFETY BLADES SIZE 11, STERILE

## (undated) DEVICE — GLOVE SRG BIOGEL 7.5

## (undated) DEVICE — CANNULA SEAL

## (undated) DEVICE — UROLOGIC DRAIN BAG: Brand: UNBRANDED

## (undated) DEVICE — BETHLEHEM MAJOR GENERAL PACK: Brand: CARDINAL HEALTH

## (undated) DEVICE — 3M™ IOBAN™ 2 ANTIMICROBIAL INCISE DRAPE 6650EZ: Brand: IOBAN™ 2

## (undated) DEVICE — COLUMN DRAPE

## (undated) DEVICE — JP CHAN DRN SIL HUBLESS 19FR W/TRO: Brand: CARDINAL HEALTH

## (undated) DEVICE — SURGICEL 4 X 8

## (undated) DEVICE — STERILE SURGICAL LUBRICANT,  TUBE: Brand: SURGILUBE

## (undated) DEVICE — TRAY FOLEY 16FR URIMETER SURESTEP

## (undated) DEVICE — PROGRASP FORCEPS: Brand: ENDOWRIST